# Patient Record
Sex: FEMALE | Race: WHITE | Employment: UNEMPLOYED | URBAN - METROPOLITAN AREA
[De-identification: names, ages, dates, MRNs, and addresses within clinical notes are randomized per-mention and may not be internally consistent; named-entity substitution may affect disease eponyms.]

---

## 2017-06-21 ENCOUNTER — HOSPITAL ENCOUNTER (INPATIENT)
Facility: HOSPITAL | Age: 27
LOS: 2 days | Discharge: HOME | End: 2017-06-23
Attending: OBSTETRICS & GYNECOLOGY | Admitting: OBSTETRICS & GYNECOLOGY
Payer: COMMERCIAL

## 2017-06-21 PROBLEM — Z34.90 PREGNANCY: Status: ACTIVE | Noted: 2017-06-21

## 2017-06-21 PROBLEM — Z34.90 PREGNANCY (HCC): Status: ACTIVE | Noted: 2017-06-21

## 2017-06-21 PROCEDURE — 85027 COMPLETE CBC AUTOMATED: CPT | Performed by: OBSTETRICS & GYNECOLOGY

## 2017-06-21 PROCEDURE — 86901 BLOOD TYPING SEROLOGIC RH(D): CPT | Performed by: OBSTETRICS & GYNECOLOGY

## 2017-06-21 PROCEDURE — 81002 URINALYSIS NONAUTO W/O SCOPE: CPT

## 2017-06-21 PROCEDURE — 86780 TREPONEMA PALLIDUM: CPT | Performed by: OBSTETRICS & GYNECOLOGY

## 2017-06-21 PROCEDURE — 86900 BLOOD TYPING SEROLOGIC ABO: CPT | Performed by: OBSTETRICS & GYNECOLOGY

## 2017-06-21 PROCEDURE — 86850 RBC ANTIBODY SCREEN: CPT | Performed by: OBSTETRICS & GYNECOLOGY

## 2017-06-21 RX ORDER — MELATONIN
325
Status: ON HOLD | COMMUNITY
End: 2017-06-23

## 2017-06-21 RX ORDER — IBUPROFEN 600 MG/1
600 TABLET ORAL ONCE AS NEEDED
Status: DISCONTINUED | OUTPATIENT
Start: 2017-06-21 | End: 2017-06-22 | Stop reason: HOSPADM

## 2017-06-21 RX ORDER — NALBUPHINE HCL 10 MG/ML
2.5 AMPUL (ML) INJECTION
Status: DISCONTINUED | OUTPATIENT
Start: 2017-06-21 | End: 2017-06-23

## 2017-06-21 RX ORDER — EPHEDRINE SULFATE 50 MG/ML
5 INJECTION, SOLUTION INTRAVENOUS AS NEEDED
Status: DISCONTINUED | OUTPATIENT
Start: 2017-06-21 | End: 2017-06-22

## 2017-06-21 RX ORDER — DEXTROSE, SODIUM CHLORIDE, SODIUM LACTATE, POTASSIUM CHLORIDE, AND CALCIUM CHLORIDE 5; .6; .31; .03; .02 G/100ML; G/100ML; G/100ML; G/100ML; G/100ML
INJECTION, SOLUTION INTRAVENOUS AS NEEDED
Status: DISCONTINUED | OUTPATIENT
Start: 2017-06-21 | End: 2017-06-22 | Stop reason: HOSPADM

## 2017-06-21 RX ORDER — SODIUM CHLORIDE, SODIUM LACTATE, POTASSIUM CHLORIDE, CALCIUM CHLORIDE 600; 310; 30; 20 MG/100ML; MG/100ML; MG/100ML; MG/100ML
INJECTION, SOLUTION INTRAVENOUS CONTINUOUS
Status: DISCONTINUED | OUTPATIENT
Start: 2017-06-21 | End: 2017-06-22 | Stop reason: HOSPADM

## 2017-06-21 RX ORDER — TERBUTALINE SULFATE 1 MG/ML
0.25 INJECTION, SOLUTION SUBCUTANEOUS AS NEEDED
Status: DISCONTINUED | OUTPATIENT
Start: 2017-06-21 | End: 2017-06-22 | Stop reason: HOSPADM

## 2017-06-21 NOTE — PROGRESS NOTES
Pt is a 24year old female admitted to TRG5/TRG5-A, Patient presents with:  R/o Labor: pt c/o \"regular contractions every few minutes the past couple hours\"     Pt is 40w2d intra-uterine pregnancy. Denies any leaking of fluid. Reports +fetal movement.

## 2017-06-22 RX ORDER — SIMETHICONE 80 MG
80 TABLET,CHEWABLE ORAL 3 TIMES DAILY PRN
Status: DISCONTINUED | OUTPATIENT
Start: 2017-06-22 | End: 2017-06-23

## 2017-06-22 RX ORDER — ZOLPIDEM TARTRATE 5 MG/1
5 TABLET ORAL NIGHTLY PRN
Status: DISCONTINUED | OUTPATIENT
Start: 2017-06-22 | End: 2017-06-23

## 2017-06-22 RX ORDER — IBUPROFEN 600 MG/1
600 TABLET ORAL
Status: DISCONTINUED | OUTPATIENT
Start: 2017-06-22 | End: 2017-06-22

## 2017-06-22 RX ORDER — HYDROCODONE BITARTRATE AND ACETAMINOPHEN 5; 325 MG/1; MG/1
1 TABLET ORAL EVERY 4 HOURS PRN
Status: DISCONTINUED | OUTPATIENT
Start: 2017-06-22 | End: 2017-06-23

## 2017-06-22 RX ORDER — ACETAMINOPHEN 325 MG/1
650 TABLET ORAL EVERY 4 HOURS PRN
Status: DISCONTINUED | OUTPATIENT
Start: 2017-06-22 | End: 2017-06-23

## 2017-06-22 RX ORDER — DOCUSATE SODIUM 100 MG/1
100 CAPSULE, LIQUID FILLED ORAL
Status: DISCONTINUED | OUTPATIENT
Start: 2017-06-22 | End: 2017-06-23

## 2017-06-22 RX ORDER — HYDROCODONE BITARTRATE AND ACETAMINOPHEN 5; 325 MG/1; MG/1
2 TABLET ORAL EVERY 4 HOURS PRN
Status: DISCONTINUED | OUTPATIENT
Start: 2017-06-22 | End: 2017-06-23

## 2017-06-22 RX ORDER — BISACODYL 10 MG
10 SUPPOSITORY, RECTAL RECTAL ONCE AS NEEDED
Status: ACTIVE | OUTPATIENT
Start: 2017-06-22 | End: 2017-06-22

## 2017-06-22 RX ORDER — IBUPROFEN 600 MG/1
600 TABLET ORAL EVERY 6 HOURS
Status: DISCONTINUED | OUTPATIENT
Start: 2017-06-22 | End: 2017-06-23

## 2017-06-22 NOTE — H&P
[facility]    PATIENT'S NAME: Candace Dallas   ATTENDING PHYSICIAN: Carline Santa M.D.    PATIENT ACCOUNT#:   [de-identified]    LOCATION:  [address] [place] [address]  MEDICAL RECORD #:   [id number]       YOB: 1990  ADMISSION DATE:       06/21/2017 Marco A Rosario M.D.  d: 06/22/2017 08:08:34  t: 06/22/2017 08:23:26  [facility] [id number]  [facility]/

## 2017-06-22 NOTE — L&D DELIVERY NOTE
Mother's Information           Ange Guerrero, Girl  [[id number]]     Labor Events     labor?:  No    steroids?:  None   Antibiotics received during labor?:  No   Antibiotics (enter # doses in comment):  none   Rupture date:  17  Rupture time Apgars    Living status:  Yes   Apgar Scoring Key:    0 1 2    Skin color Blue or pale Acrocyanotic Completely pink    Heart rate Absent <100 bpm >100 bpm    Reflex irritability No response Grimace Cry or active withdrawal    Muscle tone Limp Some

## 2017-06-22 NOTE — PROGRESS NOTES
Patient up to bathroom with assist x 2. Voiding without difficulty. Natty care completed. Patient transferred to mother/baby room 6840 via wheelchair in stable condition with baby and personal belongings. Accompanied by significant other and staff.   Repor

## 2017-06-22 NOTE — L&D DELIVERY NOTE
[facility]    PATIENT'S NAME: Judy Sena   ATTENDING PHYSICIAN: Abdullahi Gannon M.D.    PATIENT ACCOUNT #: [de-identified] LOCATION:  [address] [place] [address]   MEDICAL RECORD #: [id number] YOB: 1990   ADMISSION DATE: 06/21/2017 DELIVERY DATE:

## 2017-06-23 VITALS
TEMPERATURE: 98 F | HEIGHT: 67 IN | BODY MASS INDEX: 26.21 KG/M2 | SYSTOLIC BLOOD PRESSURE: 109 MMHG | HEART RATE: 79 BPM | OXYGEN SATURATION: 98 % | DIASTOLIC BLOOD PRESSURE: 79 MMHG | RESPIRATION RATE: 18 BRPM | WEIGHT: 167 LBS

## 2017-06-23 PROCEDURE — 85025 COMPLETE CBC W/AUTO DIFF WBC: CPT | Performed by: OBSTETRICS & GYNECOLOGY

## 2017-06-23 RX ORDER — IBUPROFEN 600 MG/1
TABLET ORAL
Qty: 30 TABLET | Refills: 1 | Status: SHIPPED | OUTPATIENT
Start: 2017-06-23

## 2017-06-23 NOTE — DISCHARGE SUMMARY
Pt presented in labor on evening of 17 and had  the following AM. Her postpartum course was unremarkable. She requested discharge after 24 hours. Her WBC was elevated, but she had no signs/sx of infection.   She will be seen the following day in

## 2017-06-23 NOTE — PROGRESS NOTES
Patient in stable condition. Discharge instructions given. ID bands verified. Hugs and Kisses tags removed. Per infant safety seat to auto by staff in mother's arms, taken by wheel chair.

## 2017-06-23 NOTE — PROGRESS NOTES
PPD #1    Pt without complaints. Breastfeeding. Moderate lochia and cramping. /64 mmHg  Pulse 79  Temp(Src) 97.8 °F (36.6 °C) (Oral)  Resp 18  Ht 5' 7\" (1.702 m)  Wt 167 lb (75.751 kg)  BMI 26.15 kg/m2  SpO2 98%  Breastfeeding?  Yes    Br:  Soft

## 2017-06-26 ENCOUNTER — TELEPHONE (OUTPATIENT)
Dept: OBGYN UNIT | Facility: HOSPITAL | Age: 27
End: 2017-06-26

## 2017-06-28 ENCOUNTER — TELEPHONE (OUTPATIENT)
Dept: OBGYN UNIT | Facility: HOSPITAL | Age: 27
End: 2017-06-28

## 2017-06-28 NOTE — PROGRESS NOTES
REV'D SELF AND INFANT CARE WITH MOM. VERBALIZES UNDERSTANDING OF INSTRUCTIONS REV'D. ENCOURAGED TO FOLLOW-UP WITH MDS AS DIRECTED AND WITH QUESTIONS.

## 2019-03-25 PROBLEM — Z34.90 PREGNANCY (HCC): Status: RESOLVED | Noted: 2017-06-21 | Resolved: 2019-03-25

## 2019-03-25 PROBLEM — O02.1 MISSED ABORTION: Status: ACTIVE | Noted: 2019-03-25

## 2019-03-25 PROBLEM — Z34.90 PREGNANCY: Status: RESOLVED | Noted: 2017-06-21 | Resolved: 2019-03-25

## 2019-03-25 PROBLEM — O02.1 MISSED ABORTION (HCC): Status: ACTIVE | Noted: 2019-03-25

## 2019-03-25 NOTE — H&P
[facility]    History and Physical    [place]  Patient Status:  Hospital Outpatient Surgery    1990 MRN [id number]   [facility] SURGERY Attending Jazmín Montes De Oca MD   Hosp Day # 0 PCP No primary care provider on file. Psychiatric/Behavioral: Negative. Physical Exam:   Vital Signs:  Height 5' 7\" (1.702 m), weight 120 lb (54.4 kg), currently pregnant  /70        Constitutional: She is oriented to person, place, and time.  She appears well-developed and well

## 2019-03-26 ENCOUNTER — TELEPHONE (OUTPATIENT)
Dept: OBGYN UNIT | Facility: HOSPITAL | Age: 29
End: 2019-03-26

## 2019-03-26 ENCOUNTER — HOSPITAL ENCOUNTER (OUTPATIENT)
Facility: HOSPITAL | Age: 29
Setting detail: HOSPITAL OUTPATIENT SURGERY
Discharge: HOME | End: 2019-03-26
Attending: STUDENT IN AN ORGANIZED HEALTH CARE EDUCATION/TRAINING PROGRAM | Admitting: STUDENT IN AN ORGANIZED HEALTH CARE EDUCATION/TRAINING PROGRAM
Payer: COMMERCIAL

## 2019-03-26 ENCOUNTER — ANESTHESIA (OUTPATIENT)
Dept: SURGERY | Facility: HOSPITAL | Age: 29
End: 2019-03-26
Payer: COMMERCIAL

## 2019-03-26 ENCOUNTER — ANESTHESIA EVENT (OUTPATIENT)
Dept: SURGERY | Facility: HOSPITAL | Age: 29
End: 2019-03-26
Payer: COMMERCIAL

## 2019-03-26 VITALS
BODY MASS INDEX: 18.74 KG/M2 | OXYGEN SATURATION: 100 % | RESPIRATION RATE: 16 BRPM | HEART RATE: 71 BPM | DIASTOLIC BLOOD PRESSURE: 55 MMHG | TEMPERATURE: 98 F | SYSTOLIC BLOOD PRESSURE: 107 MMHG | HEIGHT: 67 IN | WEIGHT: 119.38 LBS

## 2019-03-26 PROBLEM — O02.1 MISSED ABORTION: Status: RESOLVED | Noted: 2019-03-25 | Resolved: 2019-03-26

## 2019-03-26 PROBLEM — O02.1 MISSED ABORTION (HCC): Status: RESOLVED | Noted: 2019-03-25 | Resolved: 2019-03-26

## 2019-03-26 PROCEDURE — 10D17ZZ EXTRACTION OF PRODUCTS OF CONCEPTION, RETAINED, VIA NATURAL OR ARTIFICIAL OPENING: ICD-10-PCS | Performed by: STUDENT IN AN ORGANIZED HEALTH CARE EDUCATION/TRAINING PROGRAM

## 2019-03-26 PROCEDURE — 88305 TISSUE EXAM BY PATHOLOGIST: CPT | Performed by: STUDENT IN AN ORGANIZED HEALTH CARE EDUCATION/TRAINING PROGRAM

## 2019-03-26 RX ORDER — HYDROCODONE BITARTRATE AND ACETAMINOPHEN 5; 325 MG/1; MG/1
1 TABLET ORAL AS NEEDED
Status: COMPLETED | OUTPATIENT
Start: 2019-03-26 | End: 2019-03-26

## 2019-03-26 RX ORDER — NALOXONE HYDROCHLORIDE 0.4 MG/ML
80 INJECTION, SOLUTION INTRAMUSCULAR; INTRAVENOUS; SUBCUTANEOUS AS NEEDED
Status: DISCONTINUED | OUTPATIENT
Start: 2019-03-26 | End: 2019-03-26

## 2019-03-26 RX ORDER — HYDROCODONE BITARTRATE AND ACETAMINOPHEN 5; 325 MG/1; MG/1
2 TABLET ORAL AS NEEDED
Status: COMPLETED | OUTPATIENT
Start: 2019-03-26 | End: 2019-03-26

## 2019-03-26 RX ORDER — HYDROMORPHONE HYDROCHLORIDE 1 MG/ML
0.4 INJECTION, SOLUTION INTRAMUSCULAR; INTRAVENOUS; SUBCUTANEOUS EVERY 5 MIN PRN
Status: DISCONTINUED | OUTPATIENT
Start: 2019-03-26 | End: 2019-03-26

## 2019-03-26 RX ORDER — SODIUM CHLORIDE, SODIUM LACTATE, POTASSIUM CHLORIDE, CALCIUM CHLORIDE 600; 310; 30; 20 MG/100ML; MG/100ML; MG/100ML; MG/100ML
INJECTION, SOLUTION INTRAVENOUS CONTINUOUS
Status: DISCONTINUED | OUTPATIENT
Start: 2019-03-26 | End: 2019-03-26

## 2019-03-26 RX ORDER — HYDROCODONE BITARTRATE AND ACETAMINOPHEN 5; 325 MG/1; MG/1
TABLET ORAL
Status: DISCONTINUED
Start: 2019-03-26 | End: 2019-03-26

## 2019-03-26 RX ORDER — ACETAMINOPHEN 500 MG
1000 TABLET ORAL ONCE AS NEEDED
Status: DISCONTINUED | OUTPATIENT
Start: 2019-03-26 | End: 2019-03-26

## 2019-03-26 RX ORDER — ACETAMINOPHEN 500 MG
1000 TABLET ORAL EVERY 6 HOURS PRN
COMMUNITY

## 2019-03-26 RX ORDER — ACETAMINOPHEN 500 MG
1000 TABLET ORAL ONCE
Status: DISCONTINUED | OUTPATIENT
Start: 2019-03-26 | End: 2019-03-26 | Stop reason: HOSPADM

## 2019-03-26 NOTE — ANESTHESIA POSTPROCEDURE EVALUATION
[address] Patient Status:  Hospital Outpatient Surgery   Age/Gender 34year old female MRN [id number]   [facility] SURGERY Attending Bri Ivey MD   Hosp Day # 0 PCP No primary care provider on file.        Christina

## 2019-03-26 NOTE — ANESTHESIA PREPROCEDURE EVALUATION
PRE-OP EVALUATION    Patient Name: Romana Sarabia    Pre-op Diagnosis: MISSED AB    Procedure(s):  SUCTION DILATION AND CURETTAGE    Surgeon(s) and Role:     Brennon Blancas MD - Primary    Pre-op vitals reviewed.   Temp: 98.2 °F (36.8 °C)  Pulse: 86 findings            ASA: 2   Plan: MAC and general  NPO status verified and patient meets guidelines. Patient has not taken beta blockers in last 24 hours. Post-procedure pain management plan discussed with surgeon and patient.     Comment: MAC with bkolga

## 2019-03-26 NOTE — OPERATIVE REPORT
[facility]  Operative Report    Patient: Guy Sood  YOB: 1990  MRN: [id number]    Procedure: suction dilation and curettage  Date of procedure: 19    Pre op diagnosis: missed     Post op diagnosis: same    Indications The Allis clamps was removed from the anterior lip of the cervix. Excellent hemostasis was noted. The weighted speculum was removed. All sponge, lap and instrument counts were correct x2.  The patient tolerated the procedure well and was taken to the

## 2019-09-11 ENCOUNTER — TELEPHONE (OUTPATIENT)
Dept: HEMATOLOGY/ONCOLOGY | Facility: HOSPITAL | Age: 29
End: 2019-09-11

## 2019-09-11 NOTE — TELEPHONE ENCOUNTER
Mustapha on 's nurse voicemail requesting recent labs and progress notes to be faxed to [phone].

## 2019-09-24 ENCOUNTER — OFFICE VISIT (OUTPATIENT)
Dept: HEMATOLOGY/ONCOLOGY | Facility: HOSPITAL | Age: 29
End: 2019-09-24
Attending: INTERNAL MEDICINE
Payer: COMMERCIAL

## 2019-09-24 VITALS
WEIGHT: 120.19 LBS | BODY MASS INDEX: 18.87 KG/M2 | HEART RATE: 99 BPM | HEIGHT: 67.01 IN | DIASTOLIC BLOOD PRESSURE: 80 MMHG | OXYGEN SATURATION: 98 % | SYSTOLIC BLOOD PRESSURE: 120 MMHG | TEMPERATURE: 98 F | RESPIRATION RATE: 16 BRPM

## 2019-09-24 DIAGNOSIS — O03.9 MISCARRIAGE: ICD-10-CM

## 2019-09-24 DIAGNOSIS — D68.59 PROTEIN S DEFICIENCY (HCC): Primary | ICD-10-CM

## 2019-09-24 PROCEDURE — 99243 OFF/OP CNSLTJ NEW/EST LOW 30: CPT | Performed by: INTERNAL MEDICINE

## 2019-09-24 NOTE — PATIENT INSTRUCTIONS
Please call [phone] ([phone]) with any questions or concerns Monday through Friday 8:00 to 4:30.     For after hours or weekends/holidays for emergent needs, [phone] will reach the on-call MD.

## 2019-09-24 NOTE — PROGRESS NOTES
MD consult for protein s deficiency. Referred by Dr. Karen Anthony. Pt has had 2 miscarriages within the last year. Has a 3year old at home that she delivered to term and states did not have trouble conceiving.      Education Record    Learner:  Patient, spouse

## 2019-09-24 NOTE — CONSULTS
[facility]cer Center Report of Consultation    Patient Name: J Luis Draper   YOB: 1990   Medical Record Number: [id number]   CSN: [id number]   Consulting Physician: Siena Chinchilla MD  Referring Physician(s): Madelyne Hatchet MD  Date of Consultati Substance and Sexual Activity      Alcohol use: No      Drug use: No      Sexual activity: Not on file    Lifestyle      Physical activity:        Days per week: Not on file        Minutes per session: Not on file      Stress: Not on file    Relationships lymphadenopathy. Musculoskeletal: No myalgias, arthralgias, muscle weakness. Neurological: No headaches, dizziness, seizures, speech problems, gait problems   Psych: No anxiety/depression.     Vital Signs:  /80 (BP Location: Left arm, Patient Positi

## 2019-10-30 ENCOUNTER — APPOINTMENT (OUTPATIENT)
Dept: LAB | Age: 29
End: 2019-10-30
Attending: INTERNAL MEDICINE
Payer: COMMERCIAL

## 2019-10-30 DIAGNOSIS — D68.59 PROTEIN S DEFICIENCY (HCC): ICD-10-CM

## 2019-10-30 DIAGNOSIS — O03.9 MISCARRIAGE: ICD-10-CM

## 2019-10-30 PROCEDURE — 85306 CLOT INHIBIT PROT S FREE: CPT

## 2019-10-30 PROCEDURE — 36415 COLL VENOUS BLD VENIPUNCTURE: CPT

## 2019-10-30 PROCEDURE — 85305 CLOT INHIBIT PROT S TOTAL: CPT

## 2019-12-16 ENCOUNTER — OFFICE VISIT (OUTPATIENT)
Dept: PERINATAL CARE | Facility: HOSPITAL | Age: 29
End: 2019-12-16
Attending: OBSTETRICS & GYNECOLOGY
Payer: COMMERCIAL

## 2019-12-16 VITALS
SYSTOLIC BLOOD PRESSURE: 119 MMHG | DIASTOLIC BLOOD PRESSURE: 73 MMHG | HEIGHT: 67 IN | BODY MASS INDEX: 18.36 KG/M2 | WEIGHT: 117 LBS | HEART RATE: 102 BPM

## 2019-12-16 DIAGNOSIS — N96 RECURRENT PREGNANCY LOSS: ICD-10-CM

## 2019-12-16 DIAGNOSIS — D68.59 PROTEIN S DEFICIENCY (HCC): ICD-10-CM

## 2019-12-16 PROCEDURE — 99243 OFF/OP CNSLTJ NEW/EST LOW 30: CPT | Performed by: OBSTETRICS & GYNECOLOGY

## 2019-12-16 NOTE — PROGRESS NOTES
Reason for Consult:   Dear Dr. Marco A Rosario,    Thank you for requesting preconceptual maternal fetal medicine consultation on Yo Soliman. As you are aware she is a 34year old female.   A maternal-fetal medicine consultation was requested secondary followinst pregnancy- term  7 lbs 6 oz female  2nd preg--  8wks SAB, D&C  3rd preg-   5 wks SAB      All testing was normal except protein S was 37%. No genetic testing.       Recurrent loss:   Recurrent pregnancy loss (RPL) is one of the most frus accounting for 40 to 50 percent of cases. The prothrombin gene mutation, deficiencies in protein S, protein C, and antithrombin  account for most of the remaining cases, while rare causes include the dysfibrinogenemias.  The total incidence of an inherited common genetic cause of hereditary hyperhomocysteinemia (22 versus 8 percent), and, less frequently, protein S, protein C, or antithrombin deficiency.    Late fetal loss was strongly associated with the presence of one of the above thrombophilias in [place]

## 2020-11-09 DIAGNOSIS — Z20.822 ENCOUNTER FOR SCREENING LABORATORY TESTING FOR COVID-19 VIRUS IN ASYMPTOMATIC PATIENT: Primary | ICD-10-CM

## 2020-11-11 ENCOUNTER — APPOINTMENT (OUTPATIENT)
Dept: LAB | Age: 30
End: 2020-11-11
Attending: STUDENT IN AN ORGANIZED HEALTH CARE EDUCATION/TRAINING PROGRAM
Payer: COMMERCIAL

## 2020-11-11 DIAGNOSIS — Z20.822 ENCOUNTER FOR SCREENING LABORATORY TESTING FOR COVID-19 VIRUS IN ASYMPTOMATIC PATIENT: ICD-10-CM

## 2020-11-19 ENCOUNTER — HOSPITAL ENCOUNTER (INPATIENT)
Facility: HOSPITAL | Age: 30
LOS: 1 days | Discharge: HOME | End: 2020-11-20
Attending: OBSTETRICS & GYNECOLOGY | Admitting: OBSTETRICS & GYNECOLOGY
Payer: COMMERCIAL

## 2020-11-19 ENCOUNTER — ANESTHESIA (OUTPATIENT)
Dept: OBGYN UNIT | Facility: HOSPITAL | Age: 30
End: 2020-11-19
Payer: COMMERCIAL

## 2020-11-19 ENCOUNTER — ANESTHESIA EVENT (OUTPATIENT)
Dept: OBGYN UNIT | Facility: HOSPITAL | Age: 30
End: 2020-11-19
Payer: COMMERCIAL

## 2020-11-19 PROBLEM — Z34.90 PREGNANCY (HCC): Status: ACTIVE | Noted: 2020-11-19

## 2020-11-19 PROBLEM — Z34.90 PREGNANCY: Status: ACTIVE | Noted: 2020-11-19

## 2020-11-19 PROCEDURE — 86901 BLOOD TYPING SEROLOGIC RH(D): CPT | Performed by: OBSTETRICS & GYNECOLOGY

## 2020-11-19 PROCEDURE — 0HQ9XZZ REPAIR PERINEUM SKIN, EXTERNAL APPROACH: ICD-10-PCS | Performed by: OBSTETRICS & GYNECOLOGY

## 2020-11-19 PROCEDURE — 85025 COMPLETE CBC W/AUTO DIFF WBC: CPT | Performed by: OBSTETRICS & GYNECOLOGY

## 2020-11-19 PROCEDURE — 86850 RBC ANTIBODY SCREEN: CPT | Performed by: OBSTETRICS & GYNECOLOGY

## 2020-11-19 PROCEDURE — 86780 TREPONEMA PALLIDUM: CPT | Performed by: OBSTETRICS & GYNECOLOGY

## 2020-11-19 PROCEDURE — 86900 BLOOD TYPING SEROLOGIC ABO: CPT | Performed by: OBSTETRICS & GYNECOLOGY

## 2020-11-19 PROCEDURE — 88307 TISSUE EXAM BY PATHOLOGIST: CPT | Performed by: OBSTETRICS & GYNECOLOGY

## 2020-11-19 RX ORDER — TRISODIUM CITRATE DIHYDRATE AND CITRIC ACID MONOHYDRATE 500; 334 MG/5ML; MG/5ML
30 SOLUTION ORAL AS NEEDED
Status: DISCONTINUED | OUTPATIENT
Start: 2020-11-19 | End: 2020-11-19 | Stop reason: HOSPADM

## 2020-11-19 RX ORDER — DEXTROSE, SODIUM CHLORIDE, SODIUM LACTATE, POTASSIUM CHLORIDE, AND CALCIUM CHLORIDE 5; .6; .31; .03; .02 G/100ML; G/100ML; G/100ML; G/100ML; G/100ML
INJECTION, SOLUTION INTRAVENOUS AS NEEDED
Status: DISCONTINUED | OUTPATIENT
Start: 2020-11-19 | End: 2020-11-19 | Stop reason: HOSPADM

## 2020-11-19 RX ORDER — IBUPROFEN 600 MG/1
600 TABLET ORAL EVERY 6 HOURS PRN
Status: DISCONTINUED | OUTPATIENT
Start: 2020-11-19 | End: 2020-11-19

## 2020-11-19 RX ORDER — DOCUSATE SODIUM 100 MG/1
100 CAPSULE, LIQUID FILLED ORAL
Status: DISCONTINUED | OUTPATIENT
Start: 2020-11-19 | End: 2020-11-20

## 2020-11-19 RX ORDER — ACETAMINOPHEN 500 MG
500 TABLET ORAL EVERY 6 HOURS PRN
Status: DISCONTINUED | OUTPATIENT
Start: 2020-11-19 | End: 2020-11-19 | Stop reason: HOSPADM

## 2020-11-19 RX ORDER — BISACODYL 10 MG
10 SUPPOSITORY, RECTAL RECTAL ONCE AS NEEDED
Status: DISCONTINUED | OUTPATIENT
Start: 2020-11-19 | End: 2020-11-20

## 2020-11-19 RX ORDER — SODIUM CHLORIDE, SODIUM LACTATE, POTASSIUM CHLORIDE, CALCIUM CHLORIDE 600; 310; 30; 20 MG/100ML; MG/100ML; MG/100ML; MG/100ML
INJECTION, SOLUTION INTRAVENOUS CONTINUOUS
Status: DISCONTINUED | OUTPATIENT
Start: 2020-11-19 | End: 2020-11-19 | Stop reason: HOSPADM

## 2020-11-19 RX ORDER — SIMETHICONE 80 MG
80 TABLET,CHEWABLE ORAL 3 TIMES DAILY PRN
Status: DISCONTINUED | OUTPATIENT
Start: 2020-11-19 | End: 2020-11-20

## 2020-11-19 RX ORDER — BUPIVACAINE HCL/0.9 % NACL/PF 0.25 %
5 PLASTIC BAG, INJECTION (ML) EPIDURAL AS NEEDED
Status: DISCONTINUED | OUTPATIENT
Start: 2020-11-19 | End: 2020-11-19

## 2020-11-19 RX ORDER — ACETAMINOPHEN 325 MG/1
650 TABLET ORAL EVERY 6 HOURS PRN
Status: DISCONTINUED | OUTPATIENT
Start: 2020-11-19 | End: 2020-11-20

## 2020-11-19 RX ORDER — DIPHENHYDRAMINE HYDROCHLORIDE 50 MG/ML
12.5 INJECTION INTRAMUSCULAR; INTRAVENOUS EVERY 4 HOURS PRN
Status: DISCONTINUED | OUTPATIENT
Start: 2020-11-19 | End: 2020-11-19

## 2020-11-19 RX ORDER — ONDANSETRON 2 MG/ML
4 INJECTION INTRAMUSCULAR; INTRAVENOUS EVERY 6 HOURS PRN
Status: DISCONTINUED | OUTPATIENT
Start: 2020-11-19 | End: 2020-11-19 | Stop reason: HOSPADM

## 2020-11-19 RX ORDER — TERBUTALINE SULFATE 1 MG/ML
0.25 INJECTION, SOLUTION SUBCUTANEOUS AS NEEDED
Status: DISCONTINUED | OUTPATIENT
Start: 2020-11-19 | End: 2020-11-19 | Stop reason: HOSPADM

## 2020-11-19 RX ORDER — IBUPROFEN 600 MG/1
600 TABLET ORAL EVERY 6 HOURS
Status: DISCONTINUED | OUTPATIENT
Start: 2020-11-19 | End: 2020-11-20

## 2020-11-19 RX ORDER — AMMONIA INHALANTS 0.04 G/.3ML
0.3 INHALANT RESPIRATORY (INHALATION) AS NEEDED
Status: DISCONTINUED | OUTPATIENT
Start: 2020-11-19 | End: 2020-11-19 | Stop reason: HOSPADM

## 2020-11-19 NOTE — PLAN OF CARE
Problem: Patient/Family Goals  Goal: Patient/Family Long Term Goal  Description: Patient's Long Term Goal: Uncomplicated vaginal delivery     Interventions:    - See additional Care Plan goals for specific interventions  Outcome: Progressing  Goal: Patie

## 2020-11-19 NOTE — CONSULTS
Patient reports symptoms of cough,sore throat on 11/8, and was tested positive on 11/11. Symptoms resolving.  Can complete isolation 10 days after positive test result 11/21

## 2020-11-19 NOTE — PROGRESS NOTES
Pt is a 31year old female admitted to 120/120-A. Patient presents with:  R/o Labor     Pt is  40w1d intra-uterine pregnancy. History obtained, consents signed. Oriented to room, staff, and plan of care.     Admitted pt to 120 with reports of cont

## 2020-11-19 NOTE — PROGRESS NOTES
Pt known +Covid test on 11/11/2020. Pts  became symptomatic on 11/4 with fever, sore throat and congestion after exposure from nephew, states he did not test because he \"figured he had it because his nephew had it. \" .  Pt states she became sympto

## 2020-11-19 NOTE — ANESTHESIA PROCEDURE NOTES
Labor Analgesia  Performed by: Renée Macias MD  Authorized by: Renée Macias MD       General Information and Staff    Start Time:  11/19/2020 1:42 PM  End Time:  11/19/2020 1:48 PM  Anesthesiologist:  Renée Macias MD  Performed by:   Anesthesiologist  [place]

## 2020-11-19 NOTE — ANESTHESIA PREPROCEDURE EVALUATION
PRE-OP EVALUATION    Patient Name: Mel Caldwell    Pre-op Diagnosis: IUP @ 40.1 weeks, labor pains    Labor epidural     Pre-op vitals reviewed. Temp: 98.3 °F (36.8 °C)  Pulse: 67  Resp: 16  BP: 113/55  SpO2: 99 %  Body mass index is 25.52 kg/m².     C Pulmonary  Comment: + COVID : positive test on 11/11/20 , no current symptoms                          Neuro/Psych                                    Past Surgical History:   Procedure Laterality Date   • DILATION & CURETTAGE SUCTION N/A 3/26/2019    Per

## 2020-11-20 VITALS
HEIGHT: 67.01 IN | RESPIRATION RATE: 18 BRPM | SYSTOLIC BLOOD PRESSURE: 104 MMHG | HEART RATE: 78 BPM | WEIGHT: 163 LBS | DIASTOLIC BLOOD PRESSURE: 59 MMHG | BODY MASS INDEX: 25.58 KG/M2 | OXYGEN SATURATION: 99 % | TEMPERATURE: 98 F

## 2020-11-20 PROCEDURE — 99214 OFFICE O/P EST MOD 30 MIN: CPT

## 2020-11-20 PROCEDURE — 85025 COMPLETE CBC W/AUTO DIFF WBC: CPT | Performed by: OBSTETRICS & GYNECOLOGY

## 2020-11-20 NOTE — H&P
[facility]    PATIENT'S NAME: Didi Lott PHYSICIAN: Nisha Lieberman M.D.    PATIENT ACCOUNT#:   [de-identified]    LOCATION:  [address] [place] [address]  MEDICAL RECORD #:   [id number]       YOB: 1990  ADMISSION DATE:       11/19/ Positive fetal heart tones. Reactive tracing. Cervix on admission by RN was noted to be 80%, 3 cm, -2 station. ASSESSMENT:    1. Intrauterine pregnancy, 40-1/7 weeks, in labor. 2.   History of positive Coronavirus Disease 2019 test on 11/11/2020.

## 2020-11-20 NOTE — PROGRESS NOTES
Pt transferred to Mother Baby room 22 [id number] in stable condition. Report given to Omar Marquez RN. Infant transferred with mother in stable condition.

## 2020-11-20 NOTE — L&D DELIVERY NOTE
[facility]    PATIENT'S NAME: Hattie Rivera PHYSICIAN: Lynnette Jones M.D.    PATIENT ACCOUNT #: [de-identified] LOCATION:  [address] [place] [address]   MEDICAL RECORD #: [id number] YOB: 1990   ADMISSION DATE: 11/19/2020 DELIVERY MORGAN Risks and benefits discussed.     Dictated By Mali Dill M.D.  d: 11/19/2020 18:36:39  t: 11/19/2020 20:32:14  [facility] [id number]  [facility]/

## 2020-11-20 NOTE — PROGRESS NOTES
Labor Analgesia Follow Up Note    Patient underwent epidural anesthesia for labor analgesia,    Placenta Date/Time: 11/19/2020  6:05 PM    Delivery Date/Time[de-identified] 11/19/2020  5:58 PM    /59 (BP Location: Left arm)   Pulse 78   Temp 98.1 °F (36.7 °C) (Or

## 2020-11-20 NOTE — PROGRESS NOTES
S: doing well, bleeding moderate, breastfeeding, no sob, no chest pain, pain well managed  O: /59 (BP Location: Left arm)   Pulse 78   Temp 98.1 °F (36.7 °C) (Oral)   Resp 18   Ht 5' 7.01\" (1.702 m)   Wt 163 lb (73.9 kg)   LMP 02/12/2020   SpO2 99%

## 2020-11-20 NOTE — DISCHARGE SUMMARY
[facility]  Discharge Summary    Marge Nails Patient Status:  Inpatient    1990 MRN [id number]   [facility] 1SW-J Attending Cristino Whitmore MD   Hosp Day # 1 PCP No primary care provider on file.      Date of Admission:

## 2020-11-23 ENCOUNTER — TELEPHONE (OUTPATIENT)
Dept: OBGYN UNIT | Facility: HOSPITAL | Age: 30
End: 2020-11-23

## 2021-01-02 NOTE — L&D DELIVERY NOTE
Sydney Jolley [[id number]]    Labor Events     labor?: No   steroids?: None  Antibiotics received during labor?: No  Rupture date/time: 2020 1254     Rupture type: SROM  Fluid color: Clear  Augmentation: Oxytocin  Indications for hypoventilation Good, crying              1 Minute:  5 Minute:  10 Minute:  15 Minute:  20 Minute:    Skin color: 1  1       Heart rate: 2  2       Reflex irritablity: 2  2       Muscle tone: 2  2       Respiratory effort: 2  2       Total: 9  9          A

## 2024-01-08 ENCOUNTER — LAB ENCOUNTER (OUTPATIENT)
Dept: LAB | Age: 34
End: 2024-01-08
Attending: NURSE PRACTITIONER
Payer: COMMERCIAL

## 2024-01-08 DIAGNOSIS — R74.01 ELEVATED ALT MEASUREMENT: ICD-10-CM

## 2024-01-08 DIAGNOSIS — D72.829 ELEVATED WHITE BLOOD CELL COUNT: Primary | ICD-10-CM

## 2024-01-08 LAB
ALBUMIN SERPL-MCNC: 4 G/DL (ref 3.4–5)
ALBUMIN/GLOB SERPL: 1.1 {RATIO} (ref 1–2)
ALP LIVER SERPL-CCNC: 68 U/L
ALT SERPL-CCNC: 27 U/L
ANION GAP SERPL CALC-SCNC: 2 MMOL/L (ref 0–18)
AST SERPL-CCNC: 19 U/L (ref 15–37)
BASOPHILS # BLD AUTO: 0.05 X10(3) UL (ref 0–0.2)
BASOPHILS NFR BLD AUTO: 0.7 %
BILIRUB SERPL-MCNC: 0.6 MG/DL (ref 0.1–2)
BUN BLD-MCNC: 9 MG/DL (ref 9–23)
CALCIUM BLD-MCNC: 9.1 MG/DL (ref 8.5–10.1)
CHLORIDE SERPL-SCNC: 108 MMOL/L (ref 98–112)
CO2 SERPL-SCNC: 28 MMOL/L (ref 21–32)
CREAT BLD-MCNC: 0.62 MG/DL
EGFRCR SERPLBLD CKD-EPI 2021: 122 ML/MIN/1.73M2 (ref 60–?)
EOSINOPHIL # BLD AUTO: 0.12 X10(3) UL (ref 0–0.7)
EOSINOPHIL NFR BLD AUTO: 1.7 %
ERYTHROCYTE [DISTWIDTH] IN BLOOD BY AUTOMATED COUNT: 12.7 %
FASTING STATUS PATIENT QL REPORTED: YES
GLOBULIN PLAS-MCNC: 3.5 G/DL (ref 2.8–4.4)
GLUCOSE BLD-MCNC: 84 MG/DL (ref 70–99)
HCT VFR BLD AUTO: 36.5 %
HGB BLD-MCNC: 12.2 G/DL
IMM GRANULOCYTES # BLD AUTO: 0.01 X10(3) UL (ref 0–1)
IMM GRANULOCYTES NFR BLD: 0.1 %
LYMPHOCYTES # BLD AUTO: 2.49 X10(3) UL (ref 1–4)
LYMPHOCYTES NFR BLD AUTO: 35.1 %
MCH RBC QN AUTO: 30.2 PG (ref 26–34)
MCHC RBC AUTO-ENTMCNC: 33.4 G/DL (ref 31–37)
MCV RBC AUTO: 90.3 FL
MONOCYTES # BLD AUTO: 0.63 X10(3) UL (ref 0.1–1)
MONOCYTES NFR BLD AUTO: 8.9 %
NEUTROPHILS # BLD AUTO: 3.8 X10 (3) UL (ref 1.5–7.7)
NEUTROPHILS # BLD AUTO: 3.8 X10(3) UL (ref 1.5–7.7)
NEUTROPHILS NFR BLD AUTO: 53.5 %
OSMOLALITY SERPL CALC.SUM OF ELEC: 284 MOSM/KG (ref 275–295)
PLATELET # BLD AUTO: 317 10(3)UL (ref 150–450)
POTASSIUM SERPL-SCNC: 3.9 MMOL/L (ref 3.5–5.1)
PROT SERPL-MCNC: 7.5 G/DL (ref 6.4–8.2)
RBC # BLD AUTO: 4.04 X10(6)UL
SODIUM SERPL-SCNC: 138 MMOL/L (ref 136–145)
WBC # BLD AUTO: 7.1 X10(3) UL (ref 4–11)

## 2024-01-08 PROCEDURE — 85025 COMPLETE CBC W/AUTO DIFF WBC: CPT

## 2024-01-08 PROCEDURE — 80053 COMPREHEN METABOLIC PANEL: CPT

## 2024-01-08 PROCEDURE — 36415 COLL VENOUS BLD VENIPUNCTURE: CPT

## 2024-03-28 LAB
ANTIBODY SCREEN OB: NEGATIVE
HEPATITIS B SURFACE ANTIGEN OB: NEGATIVE
HIV RESULT OB: NEGATIVE
RAPID PLASMA REAGIN OB: NONREACTIVE

## 2024-08-19 LAB — HIV RESULT OB: NEGATIVE

## 2024-08-23 NOTE — PROGRESS NOTES
Chyna Leigh   1990 was seen for Gestational Diabetes Counseling: Individual    Date: 2024  Referring Provider: Salma Kc MD Start time: 9:40 AM End time: 10:20 AM      Assessment: Wt 155 lb 6.4 oz   BMI 24.33 kg/m²   Weight:   Wt Readings from Last 6 Encounters:   24 155 lb 6.4 oz   20 163 lb   19 117 lb   19 120 lb 3.2 oz   19 119 lb 6.1 oz   17 167 lb     Ms. Leigh presents today for initial gestational diabetes education. She has been checking her blood glucose on her friends meter who previously had GDM as well.    Estimated Date of Delivery: 2024   Gestation: 29w4d    History:     OB History    Para Term  AB Living   7 2 2 0 4 2   SAB IAB Ectopic Multiple Live Births   4 0 0 0 2        GDM Screen:     Fastin mg/dL  1hr: 193 mg/dL  2hr: 186 mg/dL  3hr: 119 mg/dL    History of GDM:  No  Family history of Type 2 Diabetes: None    Diet & Exercise:     Obtained usual diet history: 2 meals per day with snacks, aiming for healthier. Not a big breakfast person.     TYPICAL  FOOD  NOTES   Breakfast  Cup of coffee (some cream small amt of sugar) with occasional eggs. Busy time during day.          Lunch  Oatmeal, fruit, cheese, cucumbers         Dinner  Pasta, pizza. Chicken breast w veggies         Snacks  Cheese with veggies. Fruit         Beverages  Water, coffee, sparkling water.         Eating out  3x per week             Current physical activity: walking, running after kids.      Education:     GDM Overview:  Reviewed gestational diabetes as diagnosis including target blood glucose values.  Benefits, risks, and management options for improving/maintaining glucose control to mother/baby discussed.    Healthy Eating:  Discussed nutrition concepts for pregnancy/healthy eating and effects of food on BG value.  Timing of meals; what is a carbohydrate, protein, fat.  Taught: Carb counting, label reading, meal planning.  Suggested  minimal carb intake of 175 gm.    Being Active:  Benefits and effects of activity on BG discussed.  Reviewed types of recommended activity, duration, precautions, and when to call MD.    Monitoring:  Instructed on how to use glucose monitor/proper lancet disposal. Checking schedules are:   Fastin-95 mg/dL, Call MD is >105 md/dL twice in 1 week   2 Hour Post Prandial:  Less than 120 md/dL, Call MD if >140 md/dL twice in 1 week.    Pt came in with a a glucose meter:  No  Instructed /demonstrated ability to perform blood glucose checking on: OneTouch Verio Flex  BG 93 mg/dL, just ate a protein bar within the 30 min    Taking Medication:  Reviewed when medication might be indicated.    Reducing Risk:  Effects of elevated blood glucose on mother/baby reviewed.  Discussed management (hyperglycemia, hypoglycemia, sick day, other) and when to call provider.  Post pregnancy management/prevention of Type 2 DM, and increased risk of having diabetes later in life reviewed.    Healthy Coping:  Family involvement/social support encouraged.  Identification of lifestyle behaviors willing to change discussed.    Training Tools Provided:   handout.  BG Log Sheets    Recommendations:      1. Follow recommended meal plan.   2. Begin checking fasting glucose and 2 hour after meals   3. Bring glucose / food log to next visit with diabetes educator. Bring glucose log sheets to MD office visits.   4. Encouraged activity if no restrictions.   5. Encouraged Chyna to contact the diabetes center with any questions or concerns.    Glucose meter kit, test strips and lancets sent to preferred pharmacy.    Patient verbalized understanding and has no further questions at this time.  Emailed/written materials provided for all topics covered.    Scheduled pt to follow-up x 1 with the Diabetes Center 2024     Ashlie CUENCA-FORD, [facility]

## 2024-08-26 ENCOUNTER — DIABETIC EDUCATION (OUTPATIENT)
Dept: ENDOCRINOLOGY CLINIC | Facility: CLINIC | Age: 34
End: 2024-08-26
Payer: COMMERCIAL

## 2024-08-26 VITALS — BODY MASS INDEX: 24 KG/M2 | WEIGHT: 155.38 LBS

## 2024-08-26 DIAGNOSIS — O24.410 DIET CONTROLLED GESTATIONAL DIABETES MELLITUS (GDM) IN THIRD TRIMESTER (HCC): Primary | ICD-10-CM

## 2024-08-26 PROCEDURE — G0108 DIAB MANAGE TRN  PER INDIV: HCPCS

## 2024-08-26 RX ORDER — IBUPROFEN 100 MG/5ML
1 SUSPENSION ORAL AS DIRECTED
Qty: 1 KIT | Refills: 0 | Status: SHIPPED | OUTPATIENT
Start: 2024-08-26

## 2024-08-26 RX ORDER — LANCETS
EACH MISCELLANEOUS
Qty: 100 EACH | Refills: 3 | Status: SHIPPED | OUTPATIENT
Start: 2024-08-26

## 2024-08-26 NOTE — PATIENT INSTRUCTIONS
Blood Glucose Goals     your meter and supplies at the pharmacy.    Start checking your blood sugars 4 times/day:  1.) Fasting (before your first meal of the day)  2.) 2 hours after breakfast  3.) 2 hours after lunch  4.) 2 hours after dinner    Bring your recorded glucose log sheet and food log sheet to your follow up visit in 2 weeks for review:    Future Appointments   Date Time Provider Department Center   9/9/2024  8:30 AM Ashlie Shepherd RN [facility]      Ranges:   Fasting: less than 95 mg/dL  2 hours after meal: less than 120 mg/dL    Food Goals    Healthy, well rounded diet with plenty of water.    At least 175 grams of carbohydrates a day.    If 3 full meals is too much for you, aim for small frequent snacks.     Avoid going longer than 5 hours between meals/snacks.    Meal Plan:  Spreading out our carbohydrates throughout the day and adding in small amounts of movement after meals can help us deal with the glucose better.     Breakfast: 30 grams of carbohydrates.  AM Snack: 15 grams of carbohydrates.  Lunch: 45 grams of carbohydrates.  PM Snack: 15 grams of carbohydrates.  Dinner: 45 grams of carbohydrates.  Bedtime Snack: 30 grams of carbohydrates.

## 2024-09-04 ENCOUNTER — ULTRASOUND ENCOUNTER (OUTPATIENT)
Dept: PERINATAL CARE | Facility: HOSPITAL | Age: 34
End: 2024-09-04
Attending: OBSTETRICS & GYNECOLOGY
Payer: COMMERCIAL

## 2024-09-04 VITALS
HEIGHT: 67 IN | DIASTOLIC BLOOD PRESSURE: 71 MMHG | BODY MASS INDEX: 24.33 KG/M2 | WEIGHT: 155 LBS | HEART RATE: 92 BPM | SYSTOLIC BLOOD PRESSURE: 118 MMHG

## 2024-09-04 DIAGNOSIS — O24.410 DIET CONTROLLED GESTATIONAL DIABETES MELLITUS (GDM) IN THIRD TRIMESTER (HCC): Primary | ICD-10-CM

## 2024-09-04 DIAGNOSIS — O24.419 GDM (GESTATIONAL DIABETES MELLITUS) (HCC): ICD-10-CM

## 2024-09-04 PROCEDURE — 76811 OB US DETAILED SNGL FETUS: CPT | Performed by: OBSTETRICS & GYNECOLOGY

## 2024-09-04 RX ORDER — FERROUS SULFATE 325(65) MG
325 TABLET, DELAYED RELEASE (ENTERIC COATED) ORAL
COMMUNITY

## 2024-09-04 NOTE — PROGRESS NOTES
Outpatient Maternal-Fetal Medicine Consultation    Dear Dr. Kc    Thank you for requesting ultrasound evaluation and maternal fetal medicine consultation on your patient Chyna Leigh.  As you are aware she is a 34 year old female  with a mcdaniels pregnancy and an Estimated Date of Delivery: 24.  A maternal-fetal medicine consultation was requested secondary to GDM A1.  Her prenatal records and labs were reviewed.    ROS    HISTORY  OB History    Para Term  AB Living   7 2 2 0 4 2   SAB IAB Ectopic Multiple Live Births   4 0 0 0 2      # Outcome Date GA Lbr Bret/2nd Weight Sex Type Anes PTL Lv   7 Current            6 SAB 2024     Biochemical      5 SAB 2023     Biochemical      4 Term 20 40w1d  6 lb 14.1 oz (3.12 kg) M NORMAL SPONT EPI N BETTY      Complications: Variable decelerations   3 SAB 19     Biochemical      2 SAB 19 6w0d    SAB      1 Term 17 40w3d / 00:50 7 lb 5.8 oz (3.34 kg) F NORMAL SPONT EPI N BETTY       Allergies:  No Known Allergies   Current Meds:  Current Outpatient Medications   Medication Sig Dispense Refill    ferrous sulfate 325 (65 FE) MG Oral Tab EC Take 1 tablet (325 mg total) by mouth daily with breakfast.      Prenatal Vit-Fe Fumarate-FA (PRENATAL VITAMINS PLUS) 27-1 MG Oral Tab Take 1 tablet by mouth daily.      Blood Glucose Monitoring Suppl (CONTOUR NEXT GEN MONITOR) w/Device Does not apply Kit 1 kit As Directed. Check blood glucose level 4 times per day for gestational diabetes. May substitute if not on insurance formulary 1 kit 0    Glucose Blood (CONTOUR NEXT TEST) In Vitro Strip Check blood glucose level 4 times per day for gestational diabetes. May substitute if not on insurance formulary 100 strip 3    Microlet Lancets Does not apply Misc Check blood glucose level 4 times per day for gestational diabetes. May substitute if not on insurance formulary 100 each 3    acetaminophen 500 MG Oral Tab Take 1,000 mg by mouth  every 6 (six) hours as needed for Pain.      ibuprofen 600 MG Oral Tab 1 po q 6 hrs prn 30 tablet 1        HISTORY:  Past Medical History:    Anemia    Visual impairment    contacts    Vitamin D deficiency      Past Surgical History:   Procedure Laterality Date    Dilation/curettage,diagnostic      Implant left      Implant right        Family History   Problem Relation Age of Onset    Breast Cancer Maternal Aunt     Prostate Cancer Maternal Uncle       Social History     Socioeconomic History    Marital status:    Tobacco Use    Smoking status: Never    Smokeless tobacco: Never   Substance and Sexual Activity    Alcohol use: No    Drug use: No   Social History Narrative    ** Merged History Encounter **               PHYSICAL EXAMINATION:  /71 (BP Location: Right arm, Patient Position: Sitting, Cuff Size: adult)   Pulse 92   Ht 5' 7\" (1.702 m)   Wt 155 lb (70.3 kg)   BMI 24.28 kg/m²   OBGyn Exam      OBSTETRIC ULTRASOUND  The patient had a follow-up growth ultrasound today which revealed normal interval fetal growth.  Ultrasound Findings:  Single IUP in breech presentation.    Placenta is posterior.   A 3 vessel cord is noted.  Cardiac activity is present at 130 bpm  EFW 1742 g ( 3 lb 13 oz); 56%.    MVP is 5.2 cm . CHERIE 16.5 cm    The fetal measurements are consistent with the established EDC. No ultrasound evidence of structural abnormalities are seen today. The nasal bone is present. No ultrasound evidence of markers for aneuploidy are seen. She understands that ultrasound exam cannot exclude genetic abnormalities and that genetic testing is recommended. The limitations of ultrasound were discussed.     Uterus and adnexa appeared normal  today on US  See PACS/Imaging Tab For Complete Ultrasound Report  I interpreted the results and reviewed them with the patient.    DISCUSSION  During her visit we discussed and reviewed the following issues:      3hour GTT:   fasting  (<95) --78                        1 hour (<180) -- 193                       2 hour (<155) --186                       3 hour (<140) --119       She was informed of the potential implications and risks associated with gestational diabetes (GDM) to her and her unborn child, especially when poorly controlled. We discussed about the increased incidence of macrosomia and related birth injury to her and her baby. We talked about the increased and associated risk of fetal hyperinsulinemia, jaundice, electrolyte imbalance, seizure activity, IUFD and adverse outcome. We talked about her increased risk of having diabetes later in life. The importance of good glycemic control and avoidance of prolonged hypo- and hyperglycemia was addressed.        She was instructed how to assess her blood sugar and started on a diabetic diet today. She was instructed to call us in one week or sooner about her blood sugar levels. Insulin therapy may be started depending on her blood sugar levels. I would suggest an ultrasound in the third trimester to assess growth and weekly NST by 36 weeks.      If she required insulin I would suggest growth ultrasound monthly in the third trimester; NST weekly by about 32 weeks and increase to twice weekly by 36 weeks.        Her diet was modified to provide three meals and three snacks with fewer carbohydrates.  She received instruction on self-monitoring of blood glucose.  She will be testing her blood sugars at fasting and 2 hour postprandially.   Fasting blood glucose should be less than 95 and two hour postprandial <120.        Only 1 post dinner elevated.  Everything else well controlled.  Increase lunch and dinner to 45-60 carbohydrates.    IMPRESSION:  IUP at 30w6d  GDMA1    RECOMMENDATIONS:  Continue care with Dr. Kc  Continue four times daily capillary blood glucose assessments (fasting and 2 hour postprandial)  Upload glucoses daily into [employer] glucose flowsheet for Weekly Maternal-Fetal Medicine review of capillary  blood glucose values  Follow-up growth ultrasound every 4 weeks in the third trimester  Weekly NSTs at 36 weeks   If medications are required for glucose control:   Weekly NSTs at 32 weeks; twice weekly NST's at 34 weeks        Thank you for allowing me to participate in the care of your patient.  Please do not hesitate to contact me if additional questions or concerns arise.      Angelita Herrera M.D.    40 minutes spent in review of records, patient consultation, documentation and coordination of care.  The relevant clinical matter(s) are summarized above.     Note to patient and family  The 21st Century Cures Act makes medical notes available to patients in the interest of transparency.  However, please be advised that this is a medical document.  It is intended as gqvn-jr-kyad communication.  It is written and medical language may contain abbreviations or verbiage that are technical and unfamiliar.  It may appear blunt or direct.  Medical documents are intended to carry relevant information, facts as evident, and the clinical opinion of the practitioner.

## 2024-09-09 ENCOUNTER — DIABETIC EDUCATION (OUTPATIENT)
Dept: ENDOCRINOLOGY CLINIC | Facility: CLINIC | Age: 34
End: 2024-09-09
Payer: COMMERCIAL

## 2024-09-09 DIAGNOSIS — O24.410 DIET CONTROLLED GESTATIONAL DIABETES MELLITUS (GDM) IN THIRD TRIMESTER (HCC): Primary | ICD-10-CM

## 2024-09-09 NOTE — PROGRESS NOTES
Chyna Leigh  : 1990 was seen for GDM  Individual Follow-Up Counseling    Date: 2024  Referring Provider: Kiley Kc MD    Start time: 8:20 AM End time: 8:30 AM      Assessment: There were no vitals taken for this visit.  Weight:   Wt Readings from Last 6 Encounters:   24 155 lb   24 155 lb 6.4 oz   20 163 lb   19 117 lb   19 120 lb 3.2 oz   19 119 lb 6.1 oz       Ms. Leigh presents today for follow up gestational diabetes education. She reports she has been doing well over the last two weeks..    Estimated Date of Delivery: 24   Gestation:31w4d    Blood Glucose Levels:      Lowest Highest Average Amount out of target   Fasting    78 mg/dL  87 mg/dL  83 mg/dL  0 readings/14 total   2hr post Breakfast    77 mg/dL  115 mg/dL  94 mg/dL  0 readings/14 total   2hr post Lunch    83 mg/dL  120 mg/dL  99 mg/dL  0 readings/14 total   2hr post Dinner    80 mg/dL  129 mg/dL  100 mg/dL  1 readings/14 total     She brought in BG log and food log. Reviewed in detail with patient. Copies made and sent to scan.     Diet & Exercise:     Obtained diet history from last 2 weeks:     TYPICAL  FOOD   Breakfast  Two eggs, toast with butter, coffee, protein shake   Two eggs, toast with butter, coffee, avocado   Two eggs, protein pancakes, coffee     Lunch  Sushi, cucumber, protein shake   Chicken, vegetables, potato soup   Chicken caesar salad, rice cakes   Steak cucumber tomato salad, nuts   Steak tacos, corn, 1/2 cup rice     Dinner  Beef tacos, pretzels   Chicken dumplings   Bread, steak salad, small portion of mashed potatoes   Sushi, steak, bread, mashed potatoes, brussels sprouts (tested limits to postprandial BG/carb intake with this meal, was 129 mg/dL after)       Snacks  Protein bar, nuts, fruit, cheese stick, keto brownie, protein shake iced coffee         Following meal plan: Yes  Skips: NA  Meals are balanced Yes .  Carb Intake is Adequate.  Food Selections are  Healthy.  Drinking plenty of water Yes, noted slightly elevated numbers (110s) when not drinking enough water.    Walks and cleans for 30-40 minutes 5-6 times a week.    Education:     GDM Overview:  Reviewed target blood glucose values for GDM.  Discussed benefits/risks to mother/baby management options to improve/maintain glucose control.     Healthy Eating:  Reviewed/Reinforced:  Nutrition concepts for pregnancy/healthy eating and effect of food on blood glucose.  Meal planning process and benefits of pre-planning meals/snacks.  Appropriate timing of meals/snacks.  Carb counting.  4 servings of calcium while pregnant/breastfeeding.    Being Active:  Reviewed benefits of effects of activity on BG values.  Reviewed types of activity, duration, precautions.    Monitoring:  Instructed to report readings to MD as directed.  Call MD: if fasting blood glucose is > 95 twice in 1 week. If 2 hr postprandial is > 120 twice in 1 week at any one meal.    Taking Medication:  Reviewed appropriate timing (if on insulin) of meds.   Reviewed probability of needing medication adjustments throughout pregnancy.     Reducing Risk:  Discussed management of (hyperglycemia, hypoglycemia) and when to call provider.    Recommendations:      1. Follow recommended meal plan.   2. Test blood glucose and ketones as directed.    3. Bring glucose log to each MD visit.   4. Start/continue activity if no restrictions.    5. Additional recommendations: follow up as needed.    Patient verbalized understanding and has no further questions at this time.      Ashlie CUENCA-FORD, [facility]

## 2024-09-09 NOTE — PATIENT INSTRUCTIONS
Blood Glucose Goals     Continue checking your blood sugars 4 times/day:  1.) Fasting (before your first meal of the day)  2.) 2 hours after breakfast  3.) 2 hours after lunch  4.) 2 hours after dinner     Ranges:   Fasting: less than 95 mg/dL  2 hours after meal: less than 120 mg/dL     Let your OB know if:  - your fasting is more than 95 mg/dL 2x in a week  - your 2 hours after a meal is more than 120 mg/dL 2x in a week     Food Goals     Healthy, well rounded diet with plenty of water.     At least 175 grams of carbohydrates a day.     If 3 full meals is too much for you, aim for small frequent snacks.      Avoid going longer than 5 hours between meals/snacks.      Aim for 4x servings of calcium per day. This will help with your baby's development. Foods with calcium include:  - Dairy products (milk, yogurt, cheese)  - Oranges and calcium added orange juice  - Kale, susana greens, broccoli  - Dried figs, papayas, and bananas  - Seeds: Omar, sesame  - Beans and lentils  - Almonds  - Rhubarb  - Fortified foods and drinks (cereals, non-dairy milks)  - Tofu  - Edamame

## 2024-10-02 ENCOUNTER — OFFICE VISIT (OUTPATIENT)
Dept: PERINATAL CARE | Facility: HOSPITAL | Age: 34
End: 2024-10-02
Attending: OBSTETRICS & GYNECOLOGY
Payer: COMMERCIAL

## 2024-10-02 VITALS
SYSTOLIC BLOOD PRESSURE: 121 MMHG | BODY MASS INDEX: 25.58 KG/M2 | WEIGHT: 163 LBS | HEIGHT: 67 IN | HEART RATE: 82 BPM | DIASTOLIC BLOOD PRESSURE: 74 MMHG

## 2024-10-02 DIAGNOSIS — O24.410 DIET CONTROLLED GESTATIONAL DIABETES MELLITUS (GDM) IN THIRD TRIMESTER (HCC): ICD-10-CM

## 2024-10-02 DIAGNOSIS — O24.410 DIET CONTROLLED GESTATIONAL DIABETES MELLITUS (GDM) IN THIRD TRIMESTER (HCC): Primary | ICD-10-CM

## 2024-10-02 PROCEDURE — 76816 OB US FOLLOW-UP PER FETUS: CPT | Performed by: OBSTETRICS & GYNECOLOGY

## 2024-10-02 PROCEDURE — 76819 FETAL BIOPHYS PROFIL W/O NST: CPT

## 2024-10-02 NOTE — PROGRESS NOTES
Outpatient Maternal-Fetal Medicine Consultation    Dear Dr. Kc    Thank you for requesting ultrasound evaluation and maternal fetal medicine consultation on your patient Chyna Leigh.  As you are aware she is a 34 year old female  with a mcdaniels pregnancy and an Estimated Date of Delivery: 24.  A maternal-fetal medicine f/u is today.  Her prenatal records and labs were reviewed.    Fetus is transitioning bbetween transverse and cephalic positions.  She is concerned about this.  ROS    HISTORY  OB History    Para Term  AB Living   7 2 2 0 4 2   SAB IAB Ectopic Multiple Live Births   4 0 0 0 2      # Outcome Date GA Lbr Bret/2nd Weight Sex Type Anes PTL Lv   7 Current            6 SAB 2024     Biochemical      5 SAB 2023     Biochemical      4 Term 20 40w1d  6 lb 14.1 oz (3.12 kg) M NORMAL SPONT EPI N BETTY      Complications: Variable decelerations   3 SAB 19     Biochemical      2 SAB 19 6w0d    SAB      1 Term 17 40w3d / 00:50 7 lb 5.8 oz (3.34 kg) F NORMAL SPONT EPI N BETTY       Allergies:  No Known Allergies   Current Meds:  Current Outpatient Medications   Medication Sig Dispense Refill    ferrous sulfate 325 (65 FE) MG Oral Tab EC Take 1 tablet (325 mg total) by mouth daily with breakfast.      Prenatal Vit-Fe Fumarate-FA (PRENATAL VITAMINS PLUS) 27-1 MG Oral Tab Take 1 tablet by mouth daily.      Blood Glucose Monitoring Suppl (CONTOUR NEXT GEN MONITOR) w/Device Does not apply Kit 1 kit As Directed. Check blood glucose level 4 times per day for gestational diabetes. May substitute if not on insurance formulary 1 kit 0    Glucose Blood (CONTOUR NEXT TEST) In Vitro Strip Check blood glucose level 4 times per day for gestational diabetes. May substitute if not on insurance formulary 100 strip 3    Microlet Lancets Does not apply Misc Check blood glucose level 4 times per day for gestational diabetes. May substitute if not on insurance formulary 100  each 3    acetaminophen 500 MG Oral Tab Take 1,000 mg by mouth every 6 (six) hours as needed for Pain.      ibuprofen 600 MG Oral Tab 1 po q 6 hrs prn 30 tablet 1        HISTORY:  Past Medical History:    Anemia    Visual impairment    contacts    Vitamin D deficiency      Past Surgical History:   Procedure Laterality Date    Dilation/curettage,diagnostic      Implant left      Implant right        Family History   Problem Relation Age of Onset    Breast Cancer Maternal Aunt     Prostate Cancer Maternal Uncle       Social History     Socioeconomic History    Marital status:    Tobacco Use    Smoking status: Never    Smokeless tobacco: Never   Substance and Sexual Activity    Alcohol use: No    Drug use: No   Social History Narrative    ** Merged History Encounter **               PHYSICAL EXAMINATION:  /74 (BP Location: Right arm, Patient Position: Sitting, Cuff Size: adult)   Pulse 82   Ht 5' 7\" (1.702 m)   Wt 163 lb (73.9 kg)   BMI 25.53 kg/m²   Physical Exam  Constitutional:       Appearance: Normal appearance.   Abdominal:      Palpations: Abdomen is soft.      Tenderness: There is no abdominal tenderness.   Neurological:      Mental Status: She is alert.   Psychiatric:         Mood and Affect: Mood normal.         Behavior: Behavior normal.           OBSTETRIC ULTRASOUND  The patient had a follow-up growth and BPP ultrasound today which revealed normal interval fetal growth and a BPP of 8/8.   Ultrasound Findings:  Single IUP in cephalic presentation.    Placenta is posterior.   Cardiac activity is present at 140 bpm  EFW 2361 g ( 5 lb 3 oz); 32%.    CHERIE is  14.1 cm.  MVP is 5.5 cm  BPP is 8/8.     The fetal measurements are consistent with established EDC. No gross ultrasound evidence of structural abnormalities are seen today. The patient understands that ultrasound cannot rule out all structural and chromosomal abnormalities.   See PACS/Imaging Tab For Complete Ultrasound Report  I  interpreted the results and reviewed them with the patient.    DISCUSSION  During her visit we discussed and reviewed the following issues:      3hour GTT:   fasting  (<95) --78                       1 hour (<180) -- 193                       2 hour (<155) --186                       3 hour (<140) --119   Please see previous MFM detailed discussion.        10/1/2024  7:33 PM 10/1/2024  7:34 PM 78   101    98    112       2024  8:31 PM 2024  8:33 PM 82   95    106    103       2024  9:03 PM 2024  9:03 PM 79   85    112    104       2024  7:40 PM 2024  7:41 PM 79   86    99    98       2024  8:24 PM 2024  8:25 PM 90   94        91    L: forgot monitor at home   2024  7:24 PM 2024  7:24 PM 85   89                  Increase lunch and dinner to 45-60 carbohydrates.    External cephalic version (ECV) refers to a procedure in which the fetus is rotated from the breech to the cephalic presentation by manipulation through the mother's abdomen. ECV is typically performed in nonlaboring women at or near term (>36 weeks) to improve their chances of having a vaginal cephalic birth.    ECV has been shown to decrease felipe frequency of  delivery but the  delivery rare after a successful ECV are still higher than the general obstetric population.  Factors that improve success are multipartity, oblique or transverse lie, posterior placenta and footling breech.  Factors that reduce success are: nuliparity, anterior or lateral placenta, low CHERIE, low birthweight, maternal obesity, Ete breech, descent of the breech into the pelvis, posteriorly lacated fetal spine and tense uteus and or maternal abdominal muscles.    The complication rate of ECV is ~6% overall which includes stillbirth, abruption, emergency  delivery, cord proplase, rupture of membranes, vaginal bleeding, fetomaternal hemorrhage, and transient fetal heart rate changes.  Transient fetal heart rate  changes making up the majority of the complicaitons.  The risk of stillbirth or abruption combined is 0.25 % (fetal death at 0.19%).  The risk for emergency  delivery is 0.35%.      Strategies to encouage spontaneous conversion to cephalic presentation include postural maneuvers and moxibustion with acupuncture.  Postrual maneuvers that have been described are elevation of the pelvis by having the mother in a knee-chest position or supine head down position with the pelvis elevated with a wedge pillow.  There is a trend to increased cephalic presention with postural maneuvers and moxibustion with acupuncture but moxibustion with accupuncture has not been shown to be cost effective.       If her fetus continues to have an unstable lie, then induction may be indicated at 39 weeks if fetus is in cephalic position.    She would be a good candidate for external cephalic version if this is needed.    No follow up US indicated at this time.    IMPRESSION:  IUP at 34w6d   GDMA1  Cephalic position    RECOMMENDATIONS:  Continue care with Dr. Kc  Continue four times daily capillary blood glucose assessments (fasting and 2 hour postprandial)  Upload glucoses daily into [facility] glucose flowsheet for Weekly Maternal-Fetal Medicine review of capillary blood glucose values  Weekly NSTs at 36 weeks  If fetus has unstable lie over the next few weeks and cephalic at 39 weeks, consider induction.          Thank you for allowing me to participate in the care of your patient.  Please do not hesitate to contact me if additional questions or concerns arise.      Angelita Herrera M.D.    30  minutes spent in review of records, patient consultation, documentation and coordination of care.  The relevant clinical matter(s) are summarized above.     Note to patient and family  The 21st Century Cures Act makes medical notes available to patients in the interest of transparency.  However, please be advised that this is a medical  document.  It is intended as mjew-bg-jeya communication.  It is written and medical language may contain abbreviations or verbiage that are technical and unfamiliar.  It may appear blunt or direct.  Medical documents are intended to carry relevant information, facts as evident, and the clinical opinion of the practitioner.

## 2024-10-07 ENCOUNTER — HOSPITAL ENCOUNTER (OUTPATIENT)
Facility: HOSPITAL | Age: 34
Discharge: HOME | End: 2024-10-07
Attending: OBSTETRICS & GYNECOLOGY | Admitting: OBSTETRICS & GYNECOLOGY
Payer: COMMERCIAL

## 2024-10-07 ENCOUNTER — APPOINTMENT (OUTPATIENT)
Dept: ULTRASOUND IMAGING | Facility: HOSPITAL | Age: 34
End: 2024-10-07
Attending: OBSTETRICS & GYNECOLOGY
Payer: COMMERCIAL

## 2024-10-07 VITALS
DIASTOLIC BLOOD PRESSURE: 69 MMHG | TEMPERATURE: 99 F | BODY MASS INDEX: 25.43 KG/M2 | HEART RATE: 86 BPM | RESPIRATION RATE: 18 BRPM | HEIGHT: 67 IN | SYSTOLIC BLOOD PRESSURE: 121 MMHG | WEIGHT: 162 LBS

## 2024-10-07 PROCEDURE — 76815 OB US LIMITED FETUS(S): CPT | Performed by: OBSTETRICS & GYNECOLOGY

## 2024-10-07 PROCEDURE — 99213 OFFICE O/P EST LOW 20 MIN: CPT

## 2024-10-07 PROCEDURE — 59025 FETAL NON-STRESS TEST: CPT

## 2024-10-07 PROCEDURE — 96360 HYDRATION IV INFUSION INIT: CPT

## 2024-10-07 PROCEDURE — 96372 THER/PROPH/DIAG INJ SC/IM: CPT

## 2024-10-07 RX ORDER — TERBUTALINE SULFATE 1 MG/ML
0.25 INJECTION, SOLUTION SUBCUTANEOUS
Status: DISCONTINUED | OUTPATIENT
Start: 2024-10-07 | End: 2024-10-07

## 2024-10-07 RX ORDER — ACETAMINOPHEN 500 MG
1000 TABLET ORAL EVERY 6 HOURS PRN
Status: DISCONTINUED | OUTPATIENT
Start: 2024-10-07 | End: 2024-10-07

## 2024-10-07 NOTE — NST
Nonstress Test   Patient: Chyna Leigh    Gestation: 35w4d    NST:       Variability: Moderate           Accelerations: Yes           Decelerations: None            Baseline: 135 BPM           Uterine Irritability: No           Contractions: Regular                                        Acoustic Stimulator: No           Nonstress Test Interpretation: Reactive                                 Additional Comments

## 2024-10-07 NOTE — DISCHARGE INSTRUCTIONS
Discharge Instructions    Diet: Regular  Activity: Normal activity         General Instructions    Call your OB doctor if: Fluid leaking from your vagina;Uterine contractions 10 minutes or closer for 1 to 2 hours;Uterine contractions increasing in intensity and frequency;Decrease in fetal movement;Vaginal bleeding;Temperature greater than 100F;Vaginal or rectal pressure      Labor Discharge Instructions    Call your OB doctor if you have any of the following signs:    Period-like cramps that may come and go  Low, dull backache  Pressure in your lower abdomen that may feel like the baby is pushing down  Change in the type or amount of vaginal discharge  Abdominal cramps that may be accompanied by diarrhea  Fluid leaking from your vagina (may or may not be bloody)  Uterine Activity Instructions: more than 6 contractions in 1 hour, for 2 hours in a row

## 2024-10-07 NOTE — PROGRESS NOTES
Pt is a 34 year old female admitted to TRG3/TRG3-A.     Chief Complaint   Patient presents with    R/o  Labor     Patient presents with back pain 2 nights ago.  States she woke up in pain and thinks \"she tweaked it\".  States back pain is worse with movement.  Jacob reports abdominal pain that started this morning.  Denies bleeding  or LOF.  + FM      Pt is  35w4d intra-uterine pregnancy.  History obtained, consents signed. Oriented to room, staff, and plan of care.

## 2024-10-07 NOTE — PROGRESS NOTES
Patient states she is feeling better, also reports UC have gone away after dose of terbutaline.  Dr. Kc aware.  Discharged to home per ambulatory in stable condition with written and verbal instructions. Patient not in active labor.  Patient verbalizes understanding of information given.

## 2024-10-10 LAB — STREP GP B CULT OB: NEGATIVE

## 2024-10-22 ENCOUNTER — TELEPHONE (OUTPATIENT)
Dept: PERINATAL CARE | Facility: HOSPITAL | Age: 34
End: 2024-10-22

## 2024-10-24 ENCOUNTER — TELEPHONE (OUTPATIENT)
Dept: OBGYN UNIT | Facility: HOSPITAL | Age: 34
End: 2024-10-24

## 2024-11-01 ENCOUNTER — ANESTHESIA EVENT (OUTPATIENT)
Dept: OBGYN UNIT | Facility: HOSPITAL | Age: 34
End: 2024-11-01
Payer: COMMERCIAL

## 2024-11-01 ENCOUNTER — APPOINTMENT (OUTPATIENT)
Dept: OBGYN CLINIC | Facility: HOSPITAL | Age: 34
End: 2024-11-01
Payer: COMMERCIAL

## 2024-11-01 ENCOUNTER — ANESTHESIA (OUTPATIENT)
Dept: OBGYN UNIT | Facility: HOSPITAL | Age: 34
End: 2024-11-01
Payer: COMMERCIAL

## 2024-11-01 ENCOUNTER — HOSPITAL ENCOUNTER (INPATIENT)
Facility: HOSPITAL | Age: 34
LOS: 2 days | Discharge: HOME | End: 2024-11-03
Attending: OBSTETRICS & GYNECOLOGY | Admitting: OBSTETRICS & GYNECOLOGY
Payer: COMMERCIAL

## 2024-11-01 LAB
ANTIBODY SCREEN: NEGATIVE
BASOPHILS # BLD AUTO: 0.03 X10(3) UL (ref 0–0.2)
BASOPHILS NFR BLD AUTO: 0.3 %
EOSINOPHIL # BLD AUTO: 0.03 X10(3) UL (ref 0–0.7)
EOSINOPHIL NFR BLD AUTO: 0.3 %
ERYTHROCYTE [DISTWIDTH] IN BLOOD BY AUTOMATED COUNT: 12.4 %
GLUCOSE BLD-MCNC: 74 MG/DL (ref 70–99)
GLUCOSE BLD-MCNC: 75 MG/DL (ref 70–99)
GLUCOSE BLD-MCNC: 81 MG/DL (ref 70–99)
GLUCOSE BLD-MCNC: 86 MG/DL (ref 70–99)
HCT VFR BLD AUTO: 37.6 %
HGB BLD-MCNC: 12.8 G/DL
IMM GRANULOCYTES # BLD AUTO: 0.05 X10(3) UL (ref 0–1)
IMM GRANULOCYTES NFR BLD: 0.5 %
LYMPHOCYTES # BLD AUTO: 1.87 X10(3) UL (ref 1–4)
LYMPHOCYTES NFR BLD AUTO: 18.1 %
MCH RBC QN AUTO: 31.6 PG (ref 26–34)
MCHC RBC AUTO-ENTMCNC: 34 G/DL (ref 31–37)
MCV RBC AUTO: 92.8 FL
MONOCYTES # BLD AUTO: 0.89 X10(3) UL (ref 0.1–1)
MONOCYTES NFR BLD AUTO: 8.6 %
NEUTROPHILS # BLD AUTO: 7.45 X10 (3) UL (ref 1.5–7.7)
NEUTROPHILS # BLD AUTO: 7.45 X10(3) UL (ref 1.5–7.7)
NEUTROPHILS NFR BLD AUTO: 72.2 %
PLATELET # BLD AUTO: 142 10(3)UL (ref 150–450)
RBC # BLD AUTO: 4.05 X10(6)UL
RH BLOOD TYPE: POSITIVE
T PALLIDUM AB SER QL IA: NONREACTIVE
WBC # BLD AUTO: 10.3 X10(3) UL (ref 4–11)

## 2024-11-01 PROCEDURE — 86780 TREPONEMA PALLIDUM: CPT | Performed by: OBSTETRICS & GYNECOLOGY

## 2024-11-01 PROCEDURE — 10907ZC DRAINAGE OF AMNIOTIC FLUID, THERAPEUTIC FROM PRODUCTS OF CONCEPTION, VIA NATURAL OR ARTIFICIAL OPENING: ICD-10-PCS | Performed by: OBSTETRICS & GYNECOLOGY

## 2024-11-01 PROCEDURE — 86850 RBC ANTIBODY SCREEN: CPT | Performed by: OBSTETRICS & GYNECOLOGY

## 2024-11-01 PROCEDURE — 3E033VJ INTRODUCTION OF OTHER HORMONE INTO PERIPHERAL VEIN, PERCUTANEOUS APPROACH: ICD-10-PCS | Performed by: OBSTETRICS & GYNECOLOGY

## 2024-11-01 PROCEDURE — 86900 BLOOD TYPING SEROLOGIC ABO: CPT | Performed by: OBSTETRICS & GYNECOLOGY

## 2024-11-01 PROCEDURE — 82947 ASSAY GLUCOSE BLOOD QUANT: CPT | Performed by: OBSTETRICS & GYNECOLOGY

## 2024-11-01 PROCEDURE — 86901 BLOOD TYPING SEROLOGIC RH(D): CPT | Performed by: OBSTETRICS & GYNECOLOGY

## 2024-11-01 PROCEDURE — 82962 GLUCOSE BLOOD TEST: CPT

## 2024-11-01 PROCEDURE — 85025 COMPLETE CBC W/AUTO DIFF WBC: CPT | Performed by: OBSTETRICS & GYNECOLOGY

## 2024-11-01 PROCEDURE — 0HQ9XZZ REPAIR PERINEUM SKIN, EXTERNAL APPROACH: ICD-10-PCS | Performed by: OBSTETRICS & GYNECOLOGY

## 2024-11-01 RX ORDER — SODIUM CHLORIDE, SODIUM LACTATE, POTASSIUM CHLORIDE, CALCIUM CHLORIDE 600; 310; 30; 20 MG/100ML; MG/100ML; MG/100ML; MG/100ML
INJECTION, SOLUTION INTRAVENOUS CONTINUOUS
Status: DISCONTINUED | OUTPATIENT
Start: 2024-11-01 | End: 2024-11-02

## 2024-11-01 RX ORDER — BUPIVACAINE HCL/0.9 % NACL/PF 0.25 %
PLASTIC BAG, INJECTION (ML) EPIDURAL
Status: DISPENSED
Start: 2024-11-01 | End: 2024-11-02

## 2024-11-01 RX ORDER — NALBUPHINE HYDROCHLORIDE 10 MG/ML
2.5 INJECTION INTRAMUSCULAR; INTRAVENOUS; SUBCUTANEOUS
Status: DISCONTINUED | OUTPATIENT
Start: 2024-11-01 | End: 2024-11-02

## 2024-11-01 RX ORDER — ACETAMINOPHEN 500 MG
500 TABLET ORAL EVERY 6 HOURS PRN
Status: DISCONTINUED | OUTPATIENT
Start: 2024-11-01 | End: 2024-11-02

## 2024-11-01 RX ORDER — SODIUM CHLORIDE 9 MG/ML
INJECTION, SOLUTION INTRAMUSCULAR; INTRAVENOUS; SUBCUTANEOUS
Status: DISPENSED
Start: 2024-11-01 | End: 2024-11-02

## 2024-11-01 RX ORDER — CITRIC ACID/SODIUM CITRATE 334-500MG
30 SOLUTION, ORAL ORAL AS NEEDED
Status: DISCONTINUED | OUTPATIENT
Start: 2024-11-01 | End: 2024-11-02

## 2024-11-01 RX ORDER — BUPIVACAINE HCL/0.9 % NACL/PF 0.25 %
5 PLASTIC BAG, INJECTION (ML) EPIDURAL AS NEEDED
Status: DISCONTINUED | OUTPATIENT
Start: 2024-11-01 | End: 2024-11-02

## 2024-11-01 RX ORDER — BUPIVACAINE HYDROCHLORIDE 2.5 MG/ML
30 INJECTION, SOLUTION EPIDURAL; INFILTRATION; INTRACAUDAL AS NEEDED
Status: DISCONTINUED | OUTPATIENT
Start: 2024-11-01 | End: 2024-11-02

## 2024-11-01 RX ORDER — LIDOCAINE HYDROCHLORIDE AND EPINEPHRINE 15; 5 MG/ML; UG/ML
5 INJECTION, SOLUTION EPIDURAL AS NEEDED
Status: DISCONTINUED | OUTPATIENT
Start: 2024-11-01 | End: 2024-11-02

## 2024-11-01 RX ORDER — SODIUM CHLORIDE 9 MG/ML
10 INJECTION, SOLUTION INTRAMUSCULAR; INTRAVENOUS; SUBCUTANEOUS AS NEEDED
Status: DISCONTINUED | OUTPATIENT
Start: 2024-11-01 | End: 2024-11-02

## 2024-11-01 RX ORDER — ONDANSETRON 2 MG/ML
4 INJECTION INTRAMUSCULAR; INTRAVENOUS EVERY 6 HOURS PRN
Status: DISCONTINUED | OUTPATIENT
Start: 2024-11-01 | End: 2024-11-02

## 2024-11-01 RX ORDER — ACETAMINOPHEN 500 MG
1000 TABLET ORAL EVERY 6 HOURS PRN
Status: DISCONTINUED | OUTPATIENT
Start: 2024-11-01 | End: 2024-11-02

## 2024-11-01 RX ORDER — LIDOCAINE HYDROCHLORIDE AND EPINEPHRINE 15; 5 MG/ML; UG/ML
INJECTION, SOLUTION EPIDURAL AS NEEDED
Status: DISCONTINUED | OUTPATIENT
Start: 2024-11-01 | End: 2024-11-01 | Stop reason: SURG

## 2024-11-01 RX ORDER — LIDOCAINE HYDROCHLORIDE 20 MG/ML
5 INJECTION, SOLUTION EPIDURAL; INFILTRATION; INTRACAUDAL; PERINEURAL AS NEEDED
Status: DISCONTINUED | OUTPATIENT
Start: 2024-11-01 | End: 2024-11-02

## 2024-11-01 RX ORDER — DEXTROSE, SODIUM CHLORIDE, SODIUM LACTATE, POTASSIUM CHLORIDE, AND CALCIUM CHLORIDE 5; .6; .31; .03; .02 G/100ML; G/100ML; G/100ML; G/100ML; G/100ML
INJECTION, SOLUTION INTRAVENOUS AS NEEDED
Status: DISCONTINUED | OUTPATIENT
Start: 2024-11-01 | End: 2024-11-02

## 2024-11-01 RX ORDER — TERBUTALINE SULFATE 1 MG/ML
0.25 INJECTION, SOLUTION SUBCUTANEOUS AS NEEDED
Status: DISCONTINUED | OUTPATIENT
Start: 2024-11-01 | End: 2024-11-02

## 2024-11-01 RX ORDER — IBUPROFEN 600 MG/1
600 TABLET, FILM COATED ORAL ONCE AS NEEDED
Status: DISCONTINUED | OUTPATIENT
Start: 2024-11-01 | End: 2024-11-02

## 2024-11-01 RX ADMIN — LIDOCAINE HYDROCHLORIDE AND EPINEPHRINE 5 ML: 15; 5 INJECTION, SOLUTION EPIDURAL at 18:34:00

## 2024-11-01 NOTE — ANESTHESIA PREPROCEDURE EVALUATION
PRE-OP EVALUATION    Patient Name: Chyna Leigh    Admit Diagnosis: pregnancy  Pregnancy (HCC)    Pre-op Diagnosis: * No surgery found *        Anesthesia Procedure: LABOR ANALGESIA    * Surgery not found *    Pre-op vitals reviewed.  Temp: 98.5 °F (36.9 °C)  Pulse: 84  Resp: 18  BP: 123/64  SpO2: 97 %  Body mass index is 26.31 kg/m².    Current medications reviewed.  Hospital Medications:   lactated ringers infusion   Intravenous Continuous    dextrose in lactated ringers 5% infusion   Intravenous PRN    lactated ringers IV bolus 500 mL  500 mL Intravenous PRN    acetaminophen (Tylenol Extra Strength) tab 500 mg  500 mg Oral Q6H PRN    acetaminophen (Tylenol Extra Strength) tab 1,000 mg  1,000 mg Oral Q6H PRN    ibuprofen (Motrin) tab 600 mg  600 mg Oral Once PRN    ondansetron (Zofran) 4 MG/2ML injection 4 mg  4 mg Intravenous Q6H PRN    oxyTOCIN in sodium chloride 0.9% (Pitocin) 30 Units/500mL infusion premix  62.5-900 es-units/min Intravenous Continuous    terbutaline (Brethine) 1 MG/ML injection 0.25 mg  0.25 mg Subcutaneous PRN    sodium citrate-citric acid (Bicitra) 500-334 MG/5ML oral solution 30 mL  30 mL Oral PRN    oxyTOCIN in sodium chloride 0.9% (Pitocin) 30 Units/500mL infusion premix  0.5-20 se-units/min Intravenous Continuous    lactated ringers IV bolus 1,000 mL  1,000 mL Intravenous Once    fentaNYL-bupivacaine 2 mcg/mL-0.125% in sodium chloride 0.9% 100 mL EPIDURAL infusion premix  12 mL/hr Epidural Continuous    fentaNYL (Sublimaze) 50 mcg/mL injection 100 mcg  100 mcg Epidural Once    lidocaine 1.5%-EPINEPHrine 1:200,000 (Xylocaine-Epinephrine) injection  5 mL Injection PRN    bupivacaine PF (Marcaine) 0.25% injection  30 mL Injection PRN    lidocaine PF (Xylocaine-MPF) 2% injection  5 mL Injection PRN    sodium chloride 0.9% PF injection 10 mL  10 mL Injection PRN    EPHEDrine (PF) 25 MG/5 ML injection 5 mg  5 mg Intravenous PRN    nalbuphine (Nubain) 10 mg/mL injection 2.5 mg  2.5 mg  Intravenous Q15 Min PRN    fentaNYL-bupivacaine in sodium chloride 0.9% 2 mcg/mL-0.125% EPIDURAL infusion premix        sodium chloride 0.9% PF 0.9% injection        fentaNYL (Sublimaze) 50 mcg/mL injection        EPHEDrine (PF) 25 MG/5 ML injection           Outpatient Medications:   Prescriptions Prior to Admission[1]    Allergies: Patient has no known allergies.      Anesthesia Evaluation    Patient summary reviewed.    Anesthetic Complications           GI/Hepatic/Renal    Negative GI/hepatic/renal ROS.                             Cardiovascular    Negative cardiovascular ROS.                                                   Endo/Other      (+) diabetes  gestational, not using insulin                         Pulmonary    Negative pulmonary ROS.                       Neuro/Psych    Negative neuro/psych ROS.                                  Past Surgical History:   Procedure Laterality Date    Dilation/curettage,diagnostic      Implant left      Implant right       Social History     Socioeconomic History    Marital status:    Tobacco Use    Smoking status: Never    Smokeless tobacco: Never   Vaping Use    Vaping status: Never Used   Substance and Sexual Activity    Alcohol use: No    Drug use: No     History   Drug Use No     Available pre-op labs reviewed.  Lab Results   Component Value Date    WBC 10.3 11/01/2024    RBC 4.05 11/01/2024    HGB 12.8 11/01/2024    HCT 37.6 11/01/2024    MCV 92.8 11/01/2024    MCH 31.6 11/01/2024    MCHC 34.0 11/01/2024    RDW 12.4 11/01/2024    .0 (L) 11/01/2024     Lab Results   Component Value Date    GLU 81 11/01/2024            Airway      Mallampati: II  Mouth opening: >3 FB  TM distance: > 6 cm  Neck ROM: full Cardiovascular    Cardiovascular exam normal.  Rhythm: regular  Rate: normal     Dental    Dentition appears grossly intact         Pulmonary    Pulmonary exam normal.  Breath sounds clear to auscultation bilaterally.               Other findings               ASA: 2   Plan: epidural  NPO status verified and patient meets guidelines.    Post-procedure pain management plan discussed with surgeon and patient.      Plan/risks discussed with: patient                Present on Admission:  **None**             [1]   Medications Prior to Admission   Medication Sig Dispense Refill Last Dose/Taking    ferrous sulfate 325 (65 FE) MG Oral Tab EC Take 1 tablet (325 mg total) by mouth daily with breakfast.   10/31/2024    Prenatal Vit-Fe Fumarate-FA (PRENATAL VITAMINS PLUS) 27-1 MG Oral Tab Take 1 tablet by mouth daily.   10/31/2024    Blood Glucose Monitoring Suppl (CONTOUR NEXT GEN MONITOR) w/Device Does not apply Kit 1 kit As Directed. Check blood glucose level 4 times per day for gestational diabetes. May substitute if not on insurance formulary 1 kit 0     Glucose Blood (CONTOUR NEXT TEST) In Vitro Strip Check blood glucose level 4 times per day for gestational diabetes. May substitute if not on insurance formulary 100 strip 3     Microlet Lancets Does not apply Misc Check blood glucose level 4 times per day for gestational diabetes. May substitute if not on insurance formulary 100 each 3     acetaminophen 500 MG Oral Tab Take 1,000 mg by mouth every 6 (six) hours as needed for Pain.       ibuprofen 600 MG Oral Tab 1 po q 6 hrs prn 30 tablet 1

## 2024-11-01 NOTE — ANESTHESIA PROCEDURE NOTES
Labor Analgesia    Date/Time: 11/1/2024 6:25 PM    Performed by: Martin Koo MD  Authorized by: Martin Koo MD      General Information and Staff    Start Time:  11/1/2024 6:25 PM  End Time:  11/1/2024 6:28 PM  Anesthesiologist:  Martin Koo MD  Performed by:  Anesthesiologist  Patient Location:  OB  Site Identification: surface landmarks    Reason for Block: labor epidural    Preanesthetic Checklist: patient identified, IV checked, risks and benefits discussed, monitors and equipment checked, pre-op evaluation, timeout performed, IV bolus, anesthesia consent and sterile technique used      Procedure Details    Patient Position:  Sitting  Prep: ChloraPrep    Monitoring:  Heart rate and continuous pulse ox  Approach:  Midline    Epidural Needle    Injection Technique:  ZOË saline  Needle Type:  Tuohy  Needle Gauge:  17 G  Needle Length:  3.375 in  Needle Insertion Depth:  5  Location:  L3-4    Spinal Needle      Catheter    Catheter Type:  End hole  Catheter Size:  19 G  Catheter at Skin Depth:  14  Test Dose:  Negative    Assessment    Sensory Level:  T6    Additional Comments

## 2024-11-01 NOTE — PROGRESS NOTES
Pt is a 34 year old female admitted to 105/-A.     Chief Complaint   Patient presents with    Scheduled Induction     Gestational dm      Pt is  39w1d intra-uterine pregnancy.  History obtained, consents signed. Oriented to room, staff, and plan of care.

## 2024-11-01 NOTE — PLAN OF CARE
Problem: BIRTH - VAGINAL/ SECTION  Goal: Fetal and maternal status remain reassuring during the birth process  Description: INTERVENTIONS:  - Monitor vital signs  - Monitor fetal heart rate  - Monitor uterine activity  - Monitor labor progression (vaginal delivery)  - DVT prophylaxis (C/S delivery)  - Surgical antibiotic prophylaxis (C/S delivery)  Outcome: Progressing     Problem: PAIN - ADULT  Goal: Verbalizes/displays adequate comfort level or patient's stated pain goal  Description: INTERVENTIONS:  - Encourage pt to monitor pain and request assistance  - Assess pain using appropriate pain scale  - Administer analgesics based on type and severity of pain and evaluate response  - Implement non-pharmacological measures as appropriate and evaluate response  - Consider cultural and social influences on pain and pain management  - Manage/alleviate anxiety  - Utilize distraction and/or relaxation techniques  - Monitor for opioid side effects  - Notify MD/LIP if interventions unsuccessful or patient reports new pain  - Anticipate increased pain with activity and pre-medicate as appropriate  Outcome: Progressing     Problem: ANXIETY  Goal: Will report anxiety at manageable levels  Description: INTERVENTIONS:  - Administer medication as ordered  - Teach and rehearse alternative coping skills  - Provide emotional support with 1:1 interaction with staff  Outcome: Progressing     Problem: Patient/Family Goals  Goal: Patient/Family Long Term Goal  Description: Patient's Long Term Goal: safe vaginal delivery    Interventions:  -   - See additional Care Plan goals for specific interventions  Outcome: Progressing  Goal: Patient/Family Short Term Goal  Description: Patient's Short Term Goal: pain control in labor     Interventions:   -   - See additional Care Plan goals for specific interventions  Outcome: Progressing

## 2024-11-02 VITALS
SYSTOLIC BLOOD PRESSURE: 126 MMHG | TEMPERATURE: 98 F | HEART RATE: 69 BPM | OXYGEN SATURATION: 97 % | BODY MASS INDEX: 26 KG/M2 | WEIGHT: 168 LBS | DIASTOLIC BLOOD PRESSURE: 73 MMHG | RESPIRATION RATE: 18 BRPM

## 2024-11-02 PROBLEM — Z34.90 PREGNANCY (HCC): Status: RESOLVED | Noted: 2020-11-19 | Resolved: 2024-11-02

## 2024-11-02 LAB
BASOPHILS # BLD AUTO: 0.03 X10(3) UL (ref 0–0.2)
BASOPHILS NFR BLD AUTO: 0.2 %
EOSINOPHIL # BLD AUTO: 0.04 X10(3) UL (ref 0–0.7)
EOSINOPHIL NFR BLD AUTO: 0.3 %
ERYTHROCYTE [DISTWIDTH] IN BLOOD BY AUTOMATED COUNT: 12.5 %
GLUCOSE BLD-MCNC: 114 MG/DL (ref 70–99)
HCT VFR BLD AUTO: 36.7 %
HGB BLD-MCNC: 12.1 G/DL
IMM GRANULOCYTES # BLD AUTO: 0.05 X10(3) UL (ref 0–1)
IMM GRANULOCYTES NFR BLD: 0.4 %
LYMPHOCYTES # BLD AUTO: 1.64 X10(3) UL (ref 1–4)
LYMPHOCYTES NFR BLD AUTO: 12.4 %
MCH RBC QN AUTO: 31.6 PG (ref 26–34)
MCHC RBC AUTO-ENTMCNC: 33 G/DL (ref 31–37)
MCV RBC AUTO: 95.8 FL
MONOCYTES # BLD AUTO: 0.84 X10(3) UL (ref 0.1–1)
MONOCYTES NFR BLD AUTO: 6.4 %
NEUTROPHILS # BLD AUTO: 10.59 X10 (3) UL (ref 1.5–7.7)
NEUTROPHILS # BLD AUTO: 10.59 X10(3) UL (ref 1.5–7.7)
NEUTROPHILS NFR BLD AUTO: 80.3 %
PLATELET # BLD AUTO: 141 10(3)UL (ref 150–450)
RBC # BLD AUTO: 3.83 X10(6)UL
WBC # BLD AUTO: 13.2 X10(3) UL (ref 4–11)

## 2024-11-02 PROCEDURE — 82962 GLUCOSE BLOOD TEST: CPT

## 2024-11-02 PROCEDURE — 85025 COMPLETE CBC W/AUTO DIFF WBC: CPT | Performed by: OBSTETRICS & GYNECOLOGY

## 2024-11-02 RX ORDER — BISACODYL 10 MG
10 SUPPOSITORY, RECTAL RECTAL ONCE AS NEEDED
Status: DISCONTINUED | OUTPATIENT
Start: 2024-11-02 | End: 2024-11-03

## 2024-11-02 RX ORDER — AMMONIA INHALANTS 0.04 G/.3ML
0.3 INHALANT RESPIRATORY (INHALATION) AS NEEDED
Status: DISCONTINUED | OUTPATIENT
Start: 2024-11-02 | End: 2024-11-03

## 2024-11-02 RX ORDER — ACETAMINOPHEN 500 MG
1000 TABLET ORAL EVERY 6 HOURS PRN
Status: SHIPPED | COMMUNITY
Start: 2024-11-02

## 2024-11-02 RX ORDER — IBUPROFEN 600 MG/1
600 TABLET, FILM COATED ORAL EVERY 6 HOURS
Status: SHIPPED | COMMUNITY
Start: 2024-11-03

## 2024-11-02 RX ORDER — DOCUSATE SODIUM 100 MG/1
100 CAPSULE, LIQUID FILLED ORAL
Status: DISCONTINUED | OUTPATIENT
Start: 2024-11-02 | End: 2024-11-03

## 2024-11-02 RX ORDER — IBUPROFEN 600 MG/1
600 TABLET, FILM COATED ORAL EVERY 6 HOURS
Status: DISCONTINUED | OUTPATIENT
Start: 2024-11-02 | End: 2024-11-03

## 2024-11-02 RX ORDER — SIMETHICONE 80 MG
80 TABLET,CHEWABLE ORAL 3 TIMES DAILY PRN
Status: DISCONTINUED | OUTPATIENT
Start: 2024-11-02 | End: 2024-11-03

## 2024-11-02 RX ORDER — ACETAMINOPHEN 500 MG
500 TABLET ORAL EVERY 6 HOURS PRN
Status: DISCONTINUED | OUTPATIENT
Start: 2024-11-02 | End: 2024-11-03

## 2024-11-02 RX ORDER — ACETAMINOPHEN 500 MG
1000 TABLET ORAL EVERY 6 HOURS PRN
Status: DISCONTINUED | OUTPATIENT
Start: 2024-11-02 | End: 2024-11-03

## 2024-11-02 NOTE — L&D DELIVERY NOTE
[facility]    PATIENT'S NAME: BENJAMIN MCCARTHY   ATTENDING PHYSICIAN: Salma Kc M.D.   PATIENT ACCOUNT #: [id number] LOCATION:  [address] [place] [address]   MEDICAL RECORD #: [id number] YOB: 1990   ADMISSION DATE: 2024 DELIVERY DATE: 2024     DELIVERY NOTE    The patient is a 34-year-old female,  7, para 2-0-4-2, EDC 2024, admitted at 39-1/7 weeks on 2024 for induction of labor secondary to gestational diabetes.  The patient's cervix on admission was noted to be 50%, 3 cm, -2 station.  The patient was started on IV Pitocin.  She had an epidural placed at approximately 7:00 p.m.  She had artificial rupture of membranes performed, clear fluid, at 2131.  The patient progressed satisfactorily to complete cervical dilation at 23:24 and shortly thereafter, began pushing and with 1 push and had a normal spontaneous vaginal delivery of a viable male infant.  Birth weight was 3240 g, 7 pounds 2.3 ounces.  Apgars 9 and 9 on 2024 at 2328.  Delayed cord clamping was performed.  The cord was clamped and cut, and the infant was placed on the maternal abdomen.  Cord blood and tissue was collected for cryopreservation, and then cord blood was collected.  The placenta delivered spontaneously, complete, 3-vessel cord.  The patient had a small midline first-degree laceration which was repaired with 3-0 chromic gut on an SH needle.  The cervix, vagina, and rectum were visually and manually inspected and noted to be intact.  QBL was 124 mL.  Counts correct.    ASSESSMENT:    1.   Intrauterine pregnancy 39-1/7 weeks, status post normal vaginal delivery.    2.   Gestational diabetes.   3. Cord Blood and tissue collected for Cryopreservation    PLAN:  Routine postpartum care.  Mom and infant presently doing well.  The patient declines antibiotic and vitamin K, and the patient does not desire a circumcision for her son.    Dictated By Salma Kc M.D.  d: 2024  00:05:54  t: 11/02/2024 02:12:08  [facility] [id number]  [facility]/

## 2024-11-02 NOTE — H&P
[facility]    PATIENT'S NAME: BENJAMIN MCCARTHY   ATTENDING PHYSICIAN: Salma Kc M.D.   PATIENT ACCOUNT#:   [id number]    LOCATION:  [address] [place] [address]  MEDICAL RECORD #:   [id number]       YOB: 1990  ADMISSION DATE:       2024    HISTORY AND PHYSICAL EXAMINATION    CHIEF COMPLAINT:  \"I'm here for induction.\"    HISTORY OF PRESENT ILLNESS:  The patient is a 34-year-old female,  7, para 2-0-4-2, EDC 2024, admitted at 39-1/7 weeks' gestation for induction of labor secondary to gestational diabetes.  Good fetal movement.  No vaginal bleeding.  No nausea, vomiting, fever, chills, diarrhea.  No other complaints.    PAST MEDICAL HISTORY:  History of anemia, history of COVID-19 infection, habitual aborter.    PAST SURGICAL HISTORY:  In , D and C.  In , breast augmentation.    MEDICATIONS:  Present medications:  Prenatal vitamins, iron.    ALLERGIES:  No known drug allergies.    FAMILY HISTORY:  Breast cancer in paternal aunt age 35.  Prostate cancer in a maternal uncle.    GYNECOLOGICAL HISTORY:  Menarche age 15.  History of regular menses, every 28 days, lasting 6 days.  No history of any sexually transmitted diseases.  No history of an abnormal Pap smear.    OBSTETRICAL HISTORY:   7, para 2-0-4-2.  On 2017, 7 pound 6 ounce male infant, normal vaginal delivery, anemia, low-lying placenta, epidural.  On 2019, early embryonic demise, 6 weeks, D and C with Dr. Garza.  On 2019, chemical pregnancy, spontaneous resolution.  On 2020, 6 pounds 14 ounce female infant, normal vaginal delivery, epidural, anemia, COVID-19 infection, midline first-degree laceration.  On 2023, chemical pregnancy, spontaneous resolution.  On 2024, chemical pregnancy, spontaneous resolution.  In , the patient was noted to have gestational diabetes, diet controlled, with this pregnancy.  Blood type O positive.  Rubella immune.  Hepatitis B surface  antigen negative.  HIV negative x2.  RPR negative x2.  Beta strep at 36 weeks negative.  One-hour Glucola 137.  Urine culture negative.  GC, chlamydia negative.  Hepatitis C antibody negative.  Parvo nonimmune.    PHYSICAL EXAMINATION:    VITAL SIGNS:  The patient is 5 feet 7 inches.  She weighs approximately 168 pounds.  Vital signs stable, afebrile.    ABDOMEN:  Term uterus.  Estimated fetal weight 6-1/2 to 7 pounds.  Cephalic.  Positive fetal heart tones.  PELVIC:  Cervix on admission 50%, 3 cm, -2 station.  Reactive tracing.    ASSESSMENT:    1.   Intrauterine pregnancy 39-1/7 weeks.  2.   Gestational diabetes, diet controlled.  3.   Anemia.    PLAN:  Routine admission orders.  Epidural when patient desires.  We will observe closely.  Anticipate .      Dictated By Salma Kc M.D.  d: 2024 00:05:54  t: 2024 02:03:09  Job [id number]  [facility]/

## 2024-11-02 NOTE — PROGRESS NOTES
Labor Analgesia Follow Up Note    Patient underwent epidural anesthesia for labor analgesia,    Placenta Date/Time: 11/1/2024 11:36 PM    Delivery Date/Time:: 11/1/2024  11:28 PM    /77 (BP Location: Left arm)   Pulse 64   Temp 97.8 °F (36.6 °C) (Oral)   Resp 18   Wt 76.2 kg (168 lb)   SpO2 97%   Breastfeeding Yes   BMI 26.31 kg/m²     Assessment:  Patient seen and no apparent anesthesia related complications.    Thank you for asking us to participate in the care of your patient.

## 2024-11-02 NOTE — PROGRESS NOTES
[facility]  Post-Partum Vaginal Delivery Progress Note    Chyna Leigh Patient Status:  Inpatient    1990 MRN [id number]   Location [facility] 2SW-J Attending Salma Kc MD   Hosp Day # 1 PCP No primary care provider on file.     SUBJECTIVE:    Postpartum Day 1 s/p vaginal delivery    The patient is without complaints.  Pain is well controlled with current medications.     Baby is breastfeeding.    OBJECTIVE:    Vital signs in last 24 hours:  Temp:  [97.8 °F (36.6 °C)-98.5 °F (36.9 °C)] 97.8 °F (36.6 °C)  Pulse:  [] 64  Resp:  [16-20] 18  BP: (111-180)/(57-90) 121/77  SpO2:  [95 %-97 %] 97 %    Data Reviewed:  Lab Results   Component Value Date    WBC 13.2 2024    HGB 12.1 2024    HCT 36.7 2024    .0 2024    GLU 81 2024    PGLU 114 2024       Intake/Output:    Intake/Output Summary (Last 24 hours) at 2024 1055  Last data filed at 2024 0553  Gross per 24 hour   Intake 1892.2 ml   Output 1474 ml   Net 418.2 ml        ASSESSMENT:    Post Partum Day # 1 S/P Normal Spontaneous Vaginal Delivery.  Gestational DM- Diet Controlled      PLAN:    Routine Post partum care.  Discharge instructions given.  Home tonight per patient request.  Follow up 6 weeks.  Call prn.      Salma Kc MD  2024  10:55 AM

## 2024-11-02 NOTE — PLAN OF CARE
NURSING ADMISSION NOTE      Patient admitted via Wheelchair  Oriented to room.  Safety precautions initiated.  Bed in low position.  Call light in reach. POC reviewed. All questions answered    Problem: BIRTH - VAGINAL/ SECTION  Goal: Fetal and maternal status remain reassuring during the birth process  Description: INTERVENTIONS:  - Monitor vital signs  - Monitor fetal heart rate  - Monitor uterine activity  - Monitor labor progression (vaginal delivery)  - DVT prophylaxis (C/S delivery)  - Surgical antibiotic prophylaxis (C/S delivery)  2024 by Delmy Schumacher RN  Outcome: Completed  2024 by Delmy Schmuacher RN  Outcome: Progressing     Problem: Patient/Family Goals  Goal: Patient/Family Long Term Goal  Description: Patient's Long Term Goal: safe vaginal delivery    Interventions:  -   - See additional Care Plan goals for specific interventions  2024 by Delmy Schumacher RN  Outcome: Completed  2024 by Delmy Schumacher RN  Outcome: Progressing  Goal: Patient/Family Short Term Goal  Description: Patient's Short Term Goal: pain control in labor     Interventions:   -   - See additional Care Plan goals for specific interventions  2024 by Delmy Schumacher RN  Outcome: Completed  2024 by Delmy Schumacher RN  Outcome: Progressing

## 2024-11-02 NOTE — PROGRESS NOTES
Pt transferred via wheelchair carrying infant, both in stable condition to room 2210. FOB @ their side. ID bands checked and verified. Report given to FORD Brock.

## 2024-11-03 NOTE — PLAN OF CARE
Problem: POSTPARTUM  Goal: Long Term Goal:Experiences normal postpartum course  Description: INTERVENTIONS:  - Assess and monitor vital signs and lab values.  - Assess fundus and lochia.  - Provide ice/sitz baths for perineum discomfort.  - Monitor healing of incision/episiotomy/laceration, and assess for signs and symptoms of infection and hematoma.  - Assess bladder function and monitor for bladder distention.  - Provide/instruct/assist with pericare as needed.  - Provide VTE prophylaxis as needed.  - Monitor bowel function.  - Encourage ambulation and provide assistance as needed.  - Assess and monitor emotional status and provide social service/psych resources as needed.  - Utilize standard precautions and use personal protective equipment as indicated. Ensure aseptic care of all intravenous lines and invasive tubes/drains.  - Obtain immunization and exposure to communicable diseases history.  11/2/2024 2146 by Jami Montes, RN  Outcome: Completed  11/2/2024 2140 by Jami Montes, RN  Outcome: Progressing  Goal: Optimize infant feeding at the breast  Description: INTERVENTIONS:  - Initiate breast feeding within first hour after birth.   - Monitor effectiveness of current breast feeding efforts.  - Assess support systems available to mother/family.  - Identify cultural beliefs/practices regarding lactation, letdown techniques, maternal food preferences.  - Assess mother's knowledge and previous experience with breast feeding.  - Provide information as needed about early infant feeding cues (e.g., rooting, lip smacking, sucking fingers/hand) versus late cue of crying.  - Discuss/demonstrate breast feeding aids (e.g., infant sling, nursing footstool/pillows, and breast pumps).  - Encourage mother/other family members to express feelings/concerns, and actively listen.  - Educate father/SO about benefits of breast feeding and how to manage common lactation challenges.  - Recommend avoidance of specific medications  or substances incompatible with breast feeding.  - Assess and monitor for signs of nipple pain/trauma.  - Instruct and provide assistance with proper latch.  - Review techniques for milk expression (breast pumping) and storage of breast milk. Provide pumping equipment/supplies, instructions and assistance, as needed.  - Encourage rooming-in and breast feeding on demand.  - Encourage skin-to-skin contact.  - Provide LC support as needed.  - Assess for and manage engorgement.  - Provide breast feeding education handouts and information on community breast feeding support.   2024 by Jami Montes RN  Outcome: Completed  2024 by Jami Montes, RN  Outcome: Progressing  Goal: Establishment of adequate milk supply with medication/procedure interruptions  Description: INTERVENTIONS:  - Review techniques for milk expression (breast pumping).   - Provide pumping equipment/supplies, instructions, and assistance until it is safe to breastfeed infant.  2024 by Jami Montes, RN  Outcome: Completed  2024 by Jami Montes RN  Outcome: Progressing  Goal: Appropriate maternal -  bonding  Description: INTERVENTIONS:  - Assess caregiver- interactions.  - Assess caregiver's emotional status and coping mechanisms.  - Encourage caregiver to participate in  daily care.  - Assess support systems available to mother/family.  - Provide /case management support as needed.  Outcome: Completed

## 2024-11-03 NOTE — PLAN OF CARE
Problem: POSTPARTUM  Goal: Long Term Goal:Experiences normal postpartum course  Description: INTERVENTIONS:  - Assess and monitor vital signs and lab values.  - Assess fundus and lochia.  - Provide ice/sitz baths for perineum discomfort.  - Monitor healing of incision/episiotomy/laceration, and assess for signs and symptoms of infection and hematoma.  - Assess bladder function and monitor for bladder distention.  - Provide/instruct/assist with pericare as needed.  - Provide VTE prophylaxis as needed.  - Monitor bowel function.  - Encourage ambulation and provide assistance as needed.  - Assess and monitor emotional status and provide social service/psych resources as needed.  - Utilize standard precautions and use personal protective equipment as indicated. Ensure aseptic care of all intravenous lines and invasive tubes/drains.  - Obtain immunization and exposure to communicable diseases history.  Outcome: Progressing  Goal: Optimize infant feeding at the breast  Description: INTERVENTIONS:  - Initiate breast feeding within first hour after birth.   - Monitor effectiveness of current breast feeding efforts.  - Assess support systems available to mother/family.  - Identify cultural beliefs/practices regarding lactation, letdown techniques, maternal food preferences.  - Assess mother's knowledge and previous experience with breast feeding.  - Provide information as needed about early infant feeding cues (e.g., rooting, lip smacking, sucking fingers/hand) versus late cue of crying.  - Discuss/demonstrate breast feeding aids (e.g., infant sling, nursing footstool/pillows, and breast pumps).  - Encourage mother/other family members to express feelings/concerns, and actively listen.  - Educate father/SO about benefits of breast feeding and how to manage common lactation challenges.  - Recommend avoidance of specific medications or substances incompatible with breast feeding.  - Assess and monitor for signs of nipple  pain/trauma.  - Instruct and provide assistance with proper latch.  - Review techniques for milk expression (breast pumping) and storage of breast milk. Provide pumping equipment/supplies, instructions and assistance, as needed.  - Encourage rooming-in and breast feeding on demand.  - Encourage skin-to-skin contact.  - Provide LC support as needed.  - Assess for and manage engorgement.  - Provide breast feeding education handouts and information on community breast feeding support.   Outcome: Progressing  Goal: Establishment of adequate milk supply with medication/procedure interruptions  Description: INTERVENTIONS:  - Review techniques for milk expression (breast pumping).   - Provide pumping equipment/supplies, instructions, and assistance until it is safe to breastfeed infant.  Outcome: Progressing

## 2024-11-03 NOTE — L&D DELIVERY NOTE
Bipin Leigh [[id number]]      Labor Events     labor?: No   steroids?: None  Antibiotics received during labor?: No  Rupture date/time: 2024     Rupture type: AROM  Fluid color: Clear  Labor type: Induced Onset of Labor  Induction: Oxytocin, AROM  Indications for induction: IDDM/GDDM  Intrapartum & labor complications: None       Labor Length    1st stage: 3h 49m  2nd stage: 0h 09m  3rd stage: 0h 08m       Labor Event Times    Labor onset date/time: 2024  Dilation complete date/time: 2024  Start pushing date/time: 2024       [place] Presentation    Presentation: Vertex  Position: Left Occiput Anterior       Operative Delivery    Operative Vaginal Delivery: N/A                Shoulder Dystocia    Shoulder Dystocia: N/A       Anesthesia    Method: Epidural               Delivery      Delivery date/time:  24 23:28:00   Delivery type: Normal spontaneous vaginal delivery    Details:     Delivery location: delivery room       Delivery Providers    Delivering Clinician: Salma Kc MD   Delivery personnel:  Provider Role   Nedra Hardin, RN Baby Nurse   Ladonna Day RN Delivery Nurse             Cord    Vessels: 3 Vessels  Complications: None  Timed cord clamping: Yes  Time in sec: 40  Cord blood disposition: to lab  Gases sent?: No       Resuscitation    Method: None        Measurements      Weight: 3240 g 7 lb 2.3 oz Length: 50.8 cm     Head circum.: 35 cm Chest circum.: 33 cm      Abdominal circum.: 32 cm           Placenta    Date/time: 2024 2336  Removal: Spontaneous  Appearance: Intact  Disposition: held for future pathology       Apgars    Living status: Living   Apgar Scoring Key:    0 1 2    Skin color Blue or pale Acrocyanotic Completely pink    Heart rate Absent <100 bpm >100 bpm    Reflex irritability No response Grimace Cry or active withdrawal    Muscle tone Limp Some flexion Active motion    Respiratory  effort Absent Weak cry; hypoventilation Good, crying              1 Minute:  5 Minute:  10 Minute:  15 Minute:  20 Minute:      Skin color: 1  1       Heart rate: 2  2       Reflex irritablity: 2  2       Muscle tone: 2  2       Respiratory effort: 2  2       Total: 9  9          Apgars assigned by: KIMMY SALCIDO RN   disposition: with mother       Skin to Skin    Skin to skin initiated date/time: 2024 2330  Skin to skin with: Mother       Vaginal Count    Initial count RN: Ladonna Day RN  Initial count Tech: Alexx Mcmanus    Initial counts 11   0    Final counts 11   1    Final count RN: Ladonna Day RN  Final count MD: Salma Kc MD       Lacerations    Episiotomy: None  Perineal lacerations: 1st Repaired?: Yes     Vaginal laceration?: No      Cervical laceration?: No    Clitoral laceration?: No    Quantitative blood loss (mL): 124

## 2024-11-03 NOTE — PROGRESS NOTES
Patient discharged to home in stable condition. Discharge instructions given for pt and baby and pt states she understands. Pt in wheelchair with baby in arms accompanied by pct and her

## 2024-11-04 ENCOUNTER — TELEPHONE (OUTPATIENT)
Dept: OBGYN UNIT | Facility: HOSPITAL | Age: 34
End: 2024-11-04

## 2024-11-05 ENCOUNTER — TELEPHONE (OUTPATIENT)
Dept: OBGYN UNIT | Facility: HOSPITAL | Age: 34
End: 2024-11-05

## 2024-11-05 NOTE — PROGRESS NOTES
Unable to reach mom at this time.  Left message to check [employer] for additional information and to contact MDs office with any urgent questions and concerns.

## (undated) DEVICE — CANISTER SAFETOUCH SYST DISP

## (undated) DEVICE — TUBING SUCTION COLLECTION SET

## (undated) DEVICE — CURRETTE 8MM CVD

## (undated) DEVICE — GYN CDS: Brand: MEDLINE INDUSTRIES, INC.

## (undated) DEVICE — GAMMEX® PI HYBRID SIZE 6.5, STERILE POWDER-FREE SURGICAL GLOVE, POLYISOPRENE AND NEOPRENE BLEND: Brand: GAMMEX

## (undated) DEVICE — KENDALL SCD EXPRESS SLEEVES, KNEE LENGTH, MEDIUM: Brand: KENDALL SCD

## (undated) DEVICE — SOL  .9 1000ML BTL

## (undated) DEVICE — GAMMEX® PI HYBRID SIZE 7, STERILE POWDER-FREE SURGICAL GLOVE, POLYISOPRENE AND NEOPRENE BLEND: Brand: GAMMEX

## (undated) NOTE — LETTER
2024      Salma Kc MD  [address]  [address]  [facility] [address]  Via Fax: [phone]  Patient: Chyna Leigh  : 1990    Dear Colleague:  Thank you for referring your patient to me for a Maternal Fetal Medicine evaluation. Please see my attached note for my findings and recommendations.      Should you have any questions or concerns, please do not hesitate to contact me at the number listed below.    Best Regards,      Angelita Herrera MD  [facility]   [address]  [facility] [id number] [phone]    cc: No Recipients      [facility] Department of Maternal Fetal Medicine  Patient Name: Chyna Leigh  Patient : 1990  Physician: Angelita Herrera MD    Pt here for growth ultrasound/BPP  + FM  No complaints    Outpatient Maternal-Fetal Medicine Consultation    Dear Dr. Kc    Thank you for requesting ultrasound evaluation and maternal fetal medicine consultation on your patient Chyna Leigh.  As you are aware she is a 34 year old female  with a mcdaniels pregnancy and an Estimated Date of Delivery: 24.  A maternal-fetal medicine f/u is today.  Her prenatal records and labs were reviewed.    Fetus is transitioning bbetween transverse and cephalic positions.  She is concerned about this.  ROS    HISTORY  OB History    Para Term  AB Living   7 2 2 0 4 2   SAB IAB Ectopic Multiple Live Births   4 0 0 0 2      # Outcome Date GA Lbr Bret/2nd Weight Sex Type Anes PTL Lv   7 Current            6 2024     Biochemical      5 2023     Biochemical      4 Term 20 40w1d  6 lb 14.1 oz (3.12 kg) M NORMAL SPONT EPI N BETTY      Complications: Variable decelerations   3 SAB 19     Biochemical      2 SAB 19 6w0d    SAB      1 Term 17 40w3d / 00:50 7 lb 5.8 oz (3.34 kg) F NORMAL SPONT EPI N BETTY       Allergies:  No Known Allergies   Current Meds:  Current Outpatient Medications   Medication Sig  Dispense Refill    ferrous sulfate 325 (65 FE) MG Oral Tab EC Take 1 tablet (325 mg total) by mouth daily with breakfast.      Prenatal Vit-Fe Fumarate-FA (PRENATAL VITAMINS PLUS) 27-1 MG Oral Tab Take 1 tablet by mouth daily.      Blood Glucose Monitoring Suppl (CONTOUR NEXT GEN MONITOR) w/Device Does not apply Kit 1 kit As Directed. Check blood glucose level 4 times per day for gestational diabetes. May substitute if not on insurance formulary 1 kit 0    Glucose Blood (CONTOUR NEXT TEST) In Vitro Strip Check blood glucose level 4 times per day for gestational diabetes. May substitute if not on insurance formulary 100 strip 3    Microlet Lancets Does not apply Misc Check blood glucose level 4 times per day for gestational diabetes. May substitute if not on insurance formulary 100 each 3    acetaminophen 500 MG Oral Tab Take 1,000 mg by mouth every 6 (six) hours as needed for Pain.      ibuprofen 600 MG Oral Tab 1 po q 6 hrs prn 30 tablet 1        HISTORY:  Past Medical History:    Anemia    Visual impairment    contacts    Vitamin D deficiency      Past Surgical History:   Procedure Laterality Date    Dilation/curettage,diagnostic      Implant left      Implant right        Family History   Problem Relation Age of Onset    Breast Cancer Maternal Aunt     Prostate Cancer Maternal Uncle       Social History     Socioeconomic History    Marital status:    Tobacco Use    Smoking status: Never    Smokeless tobacco: Never   Substance and Sexual Activity    Alcohol use: No    Drug use: No   Social History Narrative    ** Merged History Encounter **               PHYSICAL EXAMINATION:  /74 (BP Location: Right arm, Patient Position: Sitting, Cuff Size: adult)   Pulse 82   Ht 5' 7\" (1.702 m)   Wt 163 lb (73.9 kg)   BMI 25.53 kg/m²   Physical Exam  Constitutional:       Appearance: Normal appearance.   Abdominal:      Palpations: Abdomen is soft.      Tenderness: There is no abdominal tenderness.    Neurological:      Mental Status: She is alert.   Psychiatric:         Mood and Affect: Mood normal.         Behavior: Behavior normal.           OBSTETRIC ULTRASOUND  The patient had a follow-up growth and BPP ultrasound today which revealed normal interval fetal growth and a BPP of 8/8.   Ultrasound Findings:  Single IUP in cephalic presentation.    Placenta is posterior.   Cardiac activity is present at 140 bpm  EFW 2361 g ( 5 lb 3 oz); 32%.    CHERIE is  14.1 cm.  MVP is 5.5 cm  BPP is 8/8.     The fetal measurements are consistent with established EDC. No gross ultrasound evidence of structural abnormalities are seen today. The patient understands that ultrasound cannot rule out all structural and chromosomal abnormalities.   See PACS/Imaging Tab For Complete Ultrasound Report  I interpreted the results and reviewed them with the patient.    DISCUSSION  During her visit we discussed and reviewed the following issues:      3hour GTT:   fasting  (<95) --78                       1 hour (<180) -- 193                       2 hour (<155) --186                       3 hour (<140) --119   Please see previous MFM detailed discussion.        10/1/2024  7:33 PM 10/1/2024  7:34 PM 78   101    98    112       9/30/2024  8:31 PM 9/30/2024  8:33 PM 82   95    106    103       9/29/2024  9:03 PM 9/29/2024  9:03 PM 79   85    112    104       9/28/2024  7:40 PM 9/28/2024  7:41 PM 79   86    99    98       9/27/2024  8:24 PM 9/27/2024  8:25 PM 90   94        91    L: forgot monitor at home   9/26/2024  7:24 PM 9/26/2024  7:24 PM 85   89                  Increase lunch and dinner to 45-60 carbohydrates.    External cephalic version (ECV) refers to a procedure in which the fetus is rotated from the breech to the cephalic presentation by manipulation through the mother's abdomen. ECV is typically performed in nonlaboring women at or near term (>36 weeks) to improve their chances of having a vaginal cephalic birth.    ECV has been  shown to decrease felipe frequency of  delivery but the  delivery rare after a successful ECV are still higher than the general obstetric population.  Factors that improve success are multipartity, oblique or transverse lie, posterior placenta and footling breech.  Factors that reduce success are: nuliparity, anterior or lateral placenta, low CHERIE, low birthweight, maternal obesity, Tee breech, descent of the breech into the pelvis, posteriorly lacated fetal spine and tense uteus and or maternal abdominal muscles.    The complication rate of ECV is ~6% overall which includes stillbirth, abruption, emergency  delivery, cord proplase, rupture of membranes, vaginal bleeding, fetomaternal hemorrhage, and transient fetal heart rate changes.  Transient fetal heart rate changes making up the majority of the complicaitons.  The risk of stillbirth or abruption combined is 0.25 % (fetal death at 0.19%).  The risk for emergency  delivery is 0.35%.      Strategies to encouage spontaneous conversion to cephalic presentation include postural maneuvers and moxibustion with acupuncture.  Postrual maneuvers that have been described are elevation of the pelvis by having the mother in a knee-chest position or supine head down position with the pelvis elevated with a wedge pillow.  There is a trend to increased cephalic presention with postural maneuvers and moxibustion with acupuncture but moxibustion with accupuncture has not been shown to be cost effective.       If her fetus continues to have an unstable lie, then induction may be indicated at 39 weeks if fetus is in cephalic position.    She would be a good candidate for external cephalic version if this is needed.    No follow up US indicated at this time.    IMPRESSION:  IUP at 34w6d   GDMA1  Cephalic position    RECOMMENDATIONS:  Continue care with Dr. Kc  Continue four times daily capillary blood glucose assessments (fasting and 2 hour  postprandial)  Upload glucoses daily into [employer] glucose flowsheet for Weekly Maternal-Fetal Medicine review of capillary blood glucose values  Weekly NSTs at 36 weeks  If fetus has unstable lie over the next few weeks and cephalic at 39 weeks, consider induction.          Thank you for allowing me to participate in the care of your patient.  Please do not hesitate to contact me if additional questions or concerns arise.      Angelita Herrera M.D.    30  minutes spent in review of records, patient consultation, documentation and coordination of care.  The relevant clinical matter(s) are summarized above.     Note to patient and family  The 21st Century Cures Act makes medical notes available to patients in the interest of transparency.  However, please be advised that this is a medical document.  It is intended as [id number] communication.  It is written and medical language may contain abbreviations or verbiage that are technical and unfamiliar.  It may appear blunt or direct.  Medical documents are intended to carry relevant information, facts as evident, and the clinical opinion of the practitioner.

## (undated) NOTE — Clinical Note
Patient seen today for GDM education follow up. Please refer to note. No further follow up indicated at this unless change in treatment plan. Thank you for the referral and your time.

## (undated) NOTE — IP AVS SNAPSHOT
[facility] [place] [address] [place], [address] ~ [phone]                Discharge Summary   6/21/2017    Kim Harris           Admission Information        Provider Department    6/21/2017 Brianna Aceves MD  2sw-J (hypertension). Preeclampsia/hypertension can happen during pregnancy and up to 6 weeks following childbirth.   Contact your doctor or midwife immediately if you experience any of the following symptoms:  · Headache that does not go away  · Visual changes 17.03 (H) (06/23/17)  2.07 (06/23/17)  1.27 (H) (06/23/17)  0.16 (06/23/17)  0.06      Radiology Exams     None      Patient Belongings       Most Recent Value    All belongings returned to patient at discharge Pt's bedside belongings    Medications Sent H you to explore options for quitting.     - If you have concerns related to behavioral health issues or thoughts of harming yourself, contact [facility] at [phone].     - If you don’t have insurance, Jey Ochoa

## (undated) NOTE — LETTER
Dear new mom:    We've missed you! The nurses of [facility] have tried to reach you by phone to ask if you had any questions regarding your health or the care of your new little one.     Please feel free to call your doctor with an